# Patient Record
Sex: MALE | Race: ASIAN | Employment: FULL TIME | URBAN - METROPOLITAN AREA
[De-identification: names, ages, dates, MRNs, and addresses within clinical notes are randomized per-mention and may not be internally consistent; named-entity substitution may affect disease eponyms.]

---

## 2017-06-09 ENCOUNTER — HOSPITAL ENCOUNTER (OUTPATIENT)
Age: 32
Discharge: HOME | End: 2017-06-09
Attending: FAMILY MEDICINE
Payer: MEDICAID

## 2017-06-09 VITALS
HEART RATE: 75 BPM | TEMPERATURE: 98 F | OXYGEN SATURATION: 97 % | DIASTOLIC BLOOD PRESSURE: 79 MMHG | RESPIRATION RATE: 18 BRPM | SYSTOLIC BLOOD PRESSURE: 110 MMHG

## 2017-06-09 DIAGNOSIS — J06.9 VIRAL UPPER RESPIRATORY TRACT INFECTION: Primary | ICD-10-CM

## 2017-06-09 DIAGNOSIS — S03.00XA TMJ (DISLOCATION OF TEMPOROMANDIBULAR JOINT), INITIAL ENCOUNTER: ICD-10-CM

## 2017-06-09 PROCEDURE — 99213 OFFICE O/P EST LOW 20 MIN: CPT

## 2017-06-09 PROCEDURE — 99204 OFFICE O/P NEW MOD 45 MIN: CPT

## 2017-06-09 RX ORDER — NAPROXEN 500 MG/1
500 TABLET ORAL 2 TIMES DAILY PRN
Qty: 20 TABLET | Refills: 0 | Status: SHIPPED | OUTPATIENT
Start: 2017-06-09 | End: 2017-06-16

## 2017-06-09 NOTE — ED PROVIDER NOTES
Patient Seen in: [facility] [place]    History   Patient presents with:  Jaw Pain    Stated Complaint: jaw pain    HPI    Pt is a 28 yo with a 5 day h/o nasal congestion and cough. There are no fevers.  He also complains of bilat Neck: Normal range of motion. Neck supple. Cardiovascular: Normal rate and regular rhythm.     Pulmonary/Chest: Effort normal and breath sounds normal.   Musculoskeletal:   Tenderness over the bilateral TMJ   Neurological: He is alert and oriented to pe

## 2017-07-27 ENCOUNTER — TELEPHONE (OUTPATIENT)
Dept: FAMILY MEDICINE CLINIC | Facility: CLINIC | Age: 32
End: 2017-07-27

## 2017-07-28 ENCOUNTER — HOSPITAL ENCOUNTER (OUTPATIENT)
Dept: GENERAL RADIOLOGY | Age: 32
Discharge: HOME | End: 2017-07-28
Attending: NURSE PRACTITIONER
Payer: COMMERCIAL

## 2017-07-28 ENCOUNTER — OFFICE VISIT (OUTPATIENT)
Dept: FAMILY MEDICINE CLINIC | Facility: CLINIC | Age: 32
End: 2017-07-28

## 2017-07-28 VITALS
BODY MASS INDEX: 35.55 KG/M2 | DIASTOLIC BLOOD PRESSURE: 85 MMHG | TEMPERATURE: 98 F | HEIGHT: 69 IN | WEIGHT: 240 LBS | SYSTOLIC BLOOD PRESSURE: 125 MMHG | HEART RATE: 61 BPM

## 2017-07-28 DIAGNOSIS — M79.644 FINGER PAIN, RIGHT: Primary | ICD-10-CM

## 2017-07-28 DIAGNOSIS — M79.644 FINGER PAIN, RIGHT: ICD-10-CM

## 2017-07-28 PROCEDURE — 99214 OFFICE O/P EST MOD 30 MIN: CPT | Performed by: NURSE PRACTITIONER

## 2017-07-28 PROCEDURE — 73140 X-RAY EXAM OF FINGER(S): CPT | Performed by: NURSE PRACTITIONER

## 2017-07-28 PROCEDURE — A4570 SPLINT: HCPCS | Performed by: NURSE PRACTITIONER

## 2017-07-28 NOTE — PROGRESS NOTES
Pt presents with right 5th finger pain-was playing volleyball 2 weeks ago and injured finger (it bent backwards) . Re injured when he caught his child from falling the other day. Finger is swollen, painful at times, tender to medial aspect of PIP joint. Concern    Caffeine Concern Yes    Comment: Soda, rarely     Social History Narrative   None on file         No current outpatient prescriptions on file.     Allergies:  No Known Allergies    Physical Exam   Constitutional: He is oriented to person, place,

## 2017-08-21 ENCOUNTER — OFFICE VISIT (OUTPATIENT)
Dept: FAMILY MEDICINE CLINIC | Facility: CLINIC | Age: 32
End: 2017-08-21

## 2017-08-21 VITALS
HEART RATE: 57 BPM | HEIGHT: 69 IN | BODY MASS INDEX: 35.1 KG/M2 | DIASTOLIC BLOOD PRESSURE: 84 MMHG | WEIGHT: 237 LBS | SYSTOLIC BLOOD PRESSURE: 121 MMHG

## 2017-08-21 DIAGNOSIS — M79.641 RIGHT HAND PAIN: Primary | ICD-10-CM

## 2017-08-21 DIAGNOSIS — Z71.84 TRAVEL ADVICE ENCOUNTER: ICD-10-CM

## 2017-08-21 PROCEDURE — 99214 OFFICE O/P EST MOD 30 MIN: CPT | Performed by: NURSE PRACTITIONER

## 2017-08-21 RX ORDER — MEFLOQUINE HYDROCHLORIDE 250 MG/1
250 TABLET ORAL
Qty: 10 TABLET | Refills: 0 | Status: SHIPPED | OUTPATIENT
Start: 2017-08-21 | End: 2021-04-06

## 2017-08-21 NOTE — PROGRESS NOTES
HPI    Pt here for f/u to right 5th finger injury a couple of weeks ago-still having some pain and swelling to right 5th PIP joint. Used brace for a couple of days. Has not used ice or ibuprofen for pain and swelling Is right handed.     Will be traveling Head: Normocephalic and atraumatic. Eyes: Conjunctivae are normal. Pupils are equal, round, and reactive to light. Right eye exhibits no discharge. Left eye exhibits no discharge. Neck: Normal range of motion. Neck supple.    Cardiovascular: Normal rate

## 2018-09-04 ENCOUNTER — TELEPHONE (OUTPATIENT)
Dept: FAMILY MEDICINE CLINIC | Facility: CLINIC | Age: 33
End: 2018-09-04

## 2018-09-04 NOTE — TELEPHONE ENCOUNTER
Pt is requesting immunization record. Per pt he needs it by tomorrow for an apt. pls advise.  Thank you

## 2020-10-15 ENCOUNTER — TELEPHONE (OUTPATIENT)
Dept: OTHER | Age: 35
End: 2020-10-15

## 2020-10-15 NOTE — TELEPHONE ENCOUNTER
Informed pt of Dr. Finney Fillers message below. Pt states he will call back to schedule appt when he has his insurance information. Pt again denied any symptoms at this time.

## 2020-10-15 NOTE — TELEPHONE ENCOUNTER
Agree with nurse triage recommendation, given symptoms would require a telephone/video visit for COVID testing.

## 2020-10-15 NOTE — TELEPHONE ENCOUNTER
Reason for Call/Symptoms: pt asking to schedule appt with Dr. Luli Betancourt for symptoms  Onset: symptoms started 10/14/2020  Courtesy Assessment: pt was feeling tired yesterday, also had body aches and pain in legs.  Pt had temp of 101.6 (temporal) this morning,

## 2020-10-16 ENCOUNTER — TELEMEDICINE (OUTPATIENT)
Dept: FAMILY MEDICINE CLINIC | Facility: CLINIC | Age: 35
End: 2020-10-16
Payer: MEDICAID

## 2020-10-16 VITALS — TEMPERATURE: 99 F

## 2020-10-16 DIAGNOSIS — Z20.822 SUSPECTED COVID-19 VIRUS INFECTION: Primary | ICD-10-CM

## 2020-10-16 PROCEDURE — 99213 OFFICE O/P EST LOW 20 MIN: CPT | Performed by: NURSE PRACTITIONER

## 2020-10-16 NOTE — TELEPHONE ENCOUNTER
Spoke with pt's wife,  verified. She is requesting for appt for pt. Virtual appt made.      FYI        Future Appointments   Date Time Provider Hemant Benton   10/16/2020 11:15 AM ROSHAN RomanO

## 2020-10-16 NOTE — PROGRESS NOTES
Telemedicine Visit for Respiratory Illness - Potential COVID-19 Infection    Virtual/Telephone Check-In    Raza Saab verbally consents to a Virtual/Telephone Check-In service on 10/16/20.  Patient understands and accepts financial responsibility f Medication Sig Dispense Refill   • Mefloquine HCl 250 MG Oral Tab Take 1 tablet (250 mg total) by mouth every 7 days. 10 tablet 0     No Known Allergies  Physical Exam   Nursing note reviewed. Constitutional: He is oriented to person, place, and time. radiology tests and decision making.   Appropriate medical decision-making and tests are ordered as detailed in the plan of care above.”  5;45 min

## 2020-10-16 NOTE — ASSESSMENT & PLAN NOTE
Due to exposure last weekend-pt advised to follow rec guidelines of the cdc of self quarantine for 14 days.    covid 19 testing ordered  Supportive care discussed to help alleviate symptoms

## 2020-10-17 ENCOUNTER — APPOINTMENT (OUTPATIENT)
Dept: LAB | Age: 35
End: 2020-10-17
Attending: NURSE PRACTITIONER
Payer: MEDICAID

## 2020-10-17 DIAGNOSIS — Z20.822 SUSPECTED COVID-19 VIRUS INFECTION: ICD-10-CM

## 2020-10-22 ENCOUNTER — PATIENT MESSAGE (OUTPATIENT)
Dept: FAMILY MEDICINE CLINIC | Facility: CLINIC | Age: 35
End: 2020-10-22

## 2020-10-22 ENCOUNTER — TELEMEDICINE (OUTPATIENT)
Dept: FAMILY MEDICINE CLINIC | Facility: CLINIC | Age: 35
End: 2020-10-22
Payer: MEDICAID

## 2020-10-22 DIAGNOSIS — U07.1 COVID-19 VIRUS INFECTION: Primary | ICD-10-CM

## 2020-10-22 PROCEDURE — 99213 OFFICE O/P EST LOW 20 MIN: CPT | Performed by: STUDENT IN AN ORGANIZED HEALTH CARE EDUCATION/TRAINING PROGRAM

## 2020-10-22 RX ORDER — DEXTROMETHORPHAN HYDROBROMIDE AND PROMETHAZINE HYDROCHLORIDE 15; 6.25 MG/5ML; MG/5ML
5 SYRUP ORAL 4 TIMES DAILY PRN
Qty: 180 ML | Refills: 0 | Status: SHIPPED | OUTPATIENT
Start: 2020-10-22 | End: 2021-04-06

## 2020-10-22 RX ORDER — LOPERAMIDE HYDROCHLORIDE 2 MG/1
2 CAPSULE ORAL 3 TIMES DAILY PRN
Qty: 30 CAPSULE | Refills: 1 | Status: SHIPPED | OUTPATIENT
Start: 2020-10-22 | End: 2021-04-06

## 2020-10-22 NOTE — PROGRESS NOTES
Virtual Telephone Check-In    Joaquin Kaur verbally consents to a Virtual/Telephone Check-In visit on 10/22/20. Patient has been referred to the [facility] website at [email or web address] to review the yearly Consent to Treat document.     Patient und tests are ordered as detailed in the plan of care above. Coding/billing information is submitted for this visit based on complexity of care and/or time spent for the visit. HPI:    Patient ID: Kirkwood Goldmann is a 38year old male.     HPI  Pt prese continue quarantine precautions as discussed  - continue rest and hydration as tolerated  - will start Imodium for diarrhea symptoms  - will start Promethazine for persistent cough  - provided with work note  - discussed red flags for urgent reevaluation

## 2020-10-23 NOTE — TELEPHONE ENCOUNTER
From: Joaquin Kaur  To: Kalen Light MD  Sent: 10/22/2020 7:32 PM CDT  Subject: Prescription Question    Hi Dr.     I just called to the [facility] to check my prescription.    they do not have my caugh and diarrhea prescriptio

## 2020-10-26 ENCOUNTER — TELEMEDICINE (OUTPATIENT)
Dept: FAMILY MEDICINE CLINIC | Facility: CLINIC | Age: 35
End: 2020-10-26
Payer: MEDICAID

## 2020-10-26 DIAGNOSIS — U07.1 COVID-19 VIRUS INFECTION: Primary | ICD-10-CM

## 2020-10-26 PROCEDURE — 99213 OFFICE O/P EST LOW 20 MIN: CPT | Performed by: STUDENT IN AN ORGANIZED HEALTH CARE EDUCATION/TRAINING PROGRAM

## 2020-10-26 NOTE — PROGRESS NOTES
Virtual Telephone Check-In    Mindy Gallegos verbally consents to a Virtual/Telephone Check-In visit on 10/26/20. Patient has been referred to the [facility] website at [email or web address] to review the yearly Consent to Treat document.     Patient und ordered as detailed in the plan of care above. Coding/billing information is submitted for this visit based on complexity of care and/or time spent for the visit. HPI:    Patient ID: Chanelle Allred is a 38year old male.     HPI  Pt presenting vi fever-reducing medications  - increased hydration and rest as tolerated  - encouraged to continue safety measures including hand hygiene and social distancing  - to call with any questions or concerns    RTC as needed.  Pt verbalized understanding and agree

## 2021-04-06 ENCOUNTER — OFFICE VISIT (OUTPATIENT)
Dept: INTERNAL MEDICINE CLINIC | Facility: CLINIC | Age: 36
End: 2021-04-06
Payer: MEDICAID

## 2021-04-06 ENCOUNTER — NURSE TRIAGE (OUTPATIENT)
Dept: FAMILY MEDICINE CLINIC | Facility: CLINIC | Age: 36
End: 2021-04-06

## 2021-04-06 VITALS
SYSTOLIC BLOOD PRESSURE: 128 MMHG | DIASTOLIC BLOOD PRESSURE: 89 MMHG | RESPIRATION RATE: 16 BRPM | HEART RATE: 78 BPM | WEIGHT: 256 LBS | HEIGHT: 70 IN | BODY MASS INDEX: 36.65 KG/M2

## 2021-04-06 DIAGNOSIS — J01.00 ACUTE NON-RECURRENT MAXILLARY SINUSITIS: ICD-10-CM

## 2021-04-06 DIAGNOSIS — K12.0 APHTHOUS ULCER: Primary | ICD-10-CM

## 2021-04-06 PROBLEM — Z20.822 SUSPECTED COVID-19 VIRUS INFECTION: Status: RESOLVED | Noted: 2020-10-16 | Resolved: 2021-04-06

## 2021-04-06 PROCEDURE — 3074F SYST BP LT 130 MM HG: CPT | Performed by: INTERNAL MEDICINE

## 2021-04-06 PROCEDURE — 3008F BODY MASS INDEX DOCD: CPT | Performed by: INTERNAL MEDICINE

## 2021-04-06 PROCEDURE — 3079F DIAST BP 80-89 MM HG: CPT | Performed by: INTERNAL MEDICINE

## 2021-04-06 PROCEDURE — 99203 OFFICE O/P NEW LOW 30 MIN: CPT | Performed by: INTERNAL MEDICINE

## 2021-04-06 RX ORDER — FLUTICASONE PROPIONATE 50 MCG
2 SPRAY, SUSPENSION (ML) NASAL DAILY
Qty: 1 BOTTLE | Refills: 3 | Status: SHIPPED | OUTPATIENT
Start: 2021-04-06 | End: 2021-07-08

## 2021-04-06 RX ORDER — LIDOCAINE HYDROCHLORIDE 20 MG/ML
SOLUTION OROPHARYNGEAL
Qty: 100 ML | Refills: 1 | Status: SHIPPED | OUTPATIENT
Start: 2021-04-06 | End: 2021-07-08

## 2021-04-06 NOTE — PROGRESS NOTES
Atiya Kaur is a 43year old male.   Patient presents with:  Sore Throat      HPI:   Urgent visit   C/c sore throat   C/o left side jaw pain x 2 days -thinks wisdom tooth is coming out - thinks he has an ulcer too   He does not grind his teeth or cl tenderness  NECK: supple,no adenopathy, nontender  LUNGS: clear to auscultation, no wheeze  CARDIO: RRR without murmur      ASSESSMENT AND PLAN:   Diagnoses and all orders for this visit:    Aphthous ulcer  -     Lidocaine Viscous HCl 2 % Mouth/Throat Solu

## 2021-04-06 NOTE — TELEPHONE ENCOUNTER
Action Requested: Summary for Provider     []  Critical Lab, Recommendations Needed  [] Need Additional Advice  [x]   FYI    []   Need Orders  [] Need Medications Sent to Pharmacy  []  Other     SUMMARY: For the last 2 days patient has been having painful

## 2021-07-08 ENCOUNTER — OFFICE VISIT (OUTPATIENT)
Dept: FAMILY MEDICINE CLINIC | Facility: CLINIC | Age: 36
End: 2021-07-08
Payer: MEDICAID

## 2021-07-08 VITALS
WEIGHT: 254.81 LBS | HEART RATE: 72 BPM | SYSTOLIC BLOOD PRESSURE: 120 MMHG | HEIGHT: 70 IN | BODY MASS INDEX: 36.48 KG/M2 | DIASTOLIC BLOOD PRESSURE: 83 MMHG

## 2021-07-08 DIAGNOSIS — Z00.00 PHYSICAL EXAM: Primary | ICD-10-CM

## 2021-07-08 DIAGNOSIS — J01.00 ACUTE NON-RECURRENT MAXILLARY SINUSITIS: ICD-10-CM

## 2021-07-08 DIAGNOSIS — E66.09 CLASS 2 OBESITY DUE TO EXCESS CALORIES WITHOUT SERIOUS COMORBIDITY WITH BODY MASS INDEX (BMI) OF 36.0 TO 36.9 IN ADULT: ICD-10-CM

## 2021-07-08 PROCEDURE — 99395 PREV VISIT EST AGE 18-39: CPT | Performed by: FAMILY MEDICINE

## 2021-07-08 PROCEDURE — 3074F SYST BP LT 130 MM HG: CPT | Performed by: FAMILY MEDICINE

## 2021-07-08 PROCEDURE — 3079F DIAST BP 80-89 MM HG: CPT | Performed by: FAMILY MEDICINE

## 2021-07-08 PROCEDURE — 3008F BODY MASS INDEX DOCD: CPT | Performed by: FAMILY MEDICINE

## 2021-07-08 RX ORDER — FLUTICASONE PROPIONATE 50 MCG
2 SPRAY, SUSPENSION (ML) NASAL DAILY
Qty: 1 EACH | Refills: 2 | Status: SHIPPED | OUTPATIENT
Start: 2021-07-08 | End: 2022-01-04

## 2021-07-08 NOTE — PROGRESS NOTES
7/8/2021  1:59 PM    Raza Nixon is a 43year old male. Chief complaint(s): Patient presents with:  Physical    HPI:     Jenny De La Cruz primary complaint is regarding CPE.      Jenny De La Cruz is a 43year old male is here for routine pe for appetite change, fatigue and fever. HENT: Positive for rhinorrhea. Negative for hearing loss and nosebleeds. Eyes: Negative for pain and visual disturbance. Respiratory: Negative for apnea and shortness of breath.     Cardiovascular: Negative for Abdomen is soft. There is no hepatomegaly, splenomegaly or mass. Tenderness: There is no abdominal tenderness. Hernia: No hernia is present. Musculoskeletal:      Cervical back: Neck supple. Comments: Spinal exam without scoliosis.       Tu Escobedo physical activity; exercise daily or at least 3 times a week for 30-60 minutes doing cardiovascular exercise. Encourage to maintain the best physical and dental hygiene possible.   Consider a  if overweight and/or having difficult in staying

## 2021-07-09 ENCOUNTER — LAB ENCOUNTER (OUTPATIENT)
Dept: LAB | Age: 36
End: 2021-07-09
Attending: FAMILY MEDICINE
Payer: MEDICAID

## 2021-07-09 DIAGNOSIS — Z00.00 PHYSICAL EXAM: ICD-10-CM

## 2021-07-09 LAB
ALBUMIN SERPL-MCNC: 3.5 G/DL (ref 3.4–5)
ALBUMIN/GLOB SERPL: 0.9 {RATIO} (ref 1–2)
ALP LIVER SERPL-CCNC: 63 U/L
ALT SERPL-CCNC: 33 U/L
ANION GAP SERPL CALC-SCNC: 5 MMOL/L (ref 0–18)
AST SERPL-CCNC: 19 U/L (ref 15–37)
BASOPHILS # BLD AUTO: 0.03 X10(3) UL (ref 0–0.2)
BASOPHILS NFR BLD AUTO: 0.4 %
BILIRUB SERPL-MCNC: 0.5 MG/DL (ref 0.1–2)
BILIRUB UR QL: NEGATIVE
BUN BLD-MCNC: 11 MG/DL (ref 7–18)
BUN/CREAT SERPL: 12.2 (ref 10–20)
CALCIUM BLD-MCNC: 8.8 MG/DL (ref 8.5–10.1)
CHLORIDE SERPL-SCNC: 104 MMOL/L (ref 98–112)
CHOLEST SMN-MCNC: 177 MG/DL (ref ?–200)
CLARITY UR: CLEAR
CO2 SERPL-SCNC: 30 MMOL/L (ref 21–32)
COLOR UR: YELLOW
CREAT BLD-MCNC: 0.9 MG/DL
DEPRECATED RDW RBC AUTO: 39.4 FL (ref 35.1–46.3)
EOSINOPHIL # BLD AUTO: 0.2 X10(3) UL (ref 0–0.7)
EOSINOPHIL NFR BLD AUTO: 2.6 %
ERYTHROCYTE [DISTWIDTH] IN BLOOD BY AUTOMATED COUNT: 12.6 % (ref 11–15)
EST. AVERAGE GLUCOSE BLD GHB EST-MCNC: 131 MG/DL (ref 68–126)
GLOBULIN PLAS-MCNC: 3.7 G/DL (ref 2.8–4.4)
GLUCOSE BLD-MCNC: 116 MG/DL (ref 70–99)
GLUCOSE UR-MCNC: NEGATIVE MG/DL
HBA1C MFR BLD HPLC: 6.2 % (ref ?–5.7)
HCT VFR BLD AUTO: 45 %
HDLC SERPL-MCNC: 45 MG/DL (ref 40–59)
HGB BLD-MCNC: 14.3 G/DL
HGB UR QL STRIP.AUTO: NEGATIVE
IMM GRANULOCYTES # BLD AUTO: 0.03 X10(3) UL (ref 0–1)
IMM GRANULOCYTES NFR BLD: 0.4 %
KETONES UR-MCNC: NEGATIVE MG/DL
LDLC SERPL CALC-MCNC: 111 MG/DL (ref ?–100)
LEUKOCYTE ESTERASE UR QL STRIP.AUTO: NEGATIVE
LYMPHOCYTES # BLD AUTO: 2.25 X10(3) UL (ref 1–4)
LYMPHOCYTES NFR BLD AUTO: 29.6 %
M PROTEIN MFR SERPL ELPH: 7.2 G/DL (ref 6.4–8.2)
MCH RBC QN AUTO: 27.1 PG (ref 26–34)
MCHC RBC AUTO-ENTMCNC: 31.8 G/DL (ref 31–37)
MCV RBC AUTO: 85.2 FL
MONOCYTES # BLD AUTO: 0.5 X10(3) UL (ref 0.1–1)
MONOCYTES NFR BLD AUTO: 6.6 %
NEUTROPHILS # BLD AUTO: 4.6 X10 (3) UL (ref 1.5–7.7)
NEUTROPHILS # BLD AUTO: 4.6 X10(3) UL (ref 1.5–7.7)
NEUTROPHILS NFR BLD AUTO: 60.4 %
NITRITE UR QL STRIP.AUTO: NEGATIVE
NONHDLC SERPL-MCNC: 132 MG/DL (ref ?–130)
OSMOLALITY SERPL CALC.SUM OF ELEC: 288 MOSM/KG (ref 275–295)
PATIENT FASTING Y/N/NP: YES
PATIENT FASTING Y/N/NP: YES
PH UR: 7 [PH] (ref 5–8)
PLATELET # BLD AUTO: 129 10(3)UL (ref 150–450)
POTASSIUM SERPL-SCNC: 4.2 MMOL/L (ref 3.5–5.1)
PROT UR-MCNC: NEGATIVE MG/DL
RBC # BLD AUTO: 5.28 X10(6)UL
SODIUM SERPL-SCNC: 139 MMOL/L (ref 136–145)
SP GR UR STRIP: 1.02 (ref 1–1.03)
TRIGL SERPL-MCNC: 117 MG/DL (ref 30–149)
TSI SER-ACNC: 1.51 MIU/ML (ref 0.36–3.74)
UROBILINOGEN UR STRIP-ACNC: <2
VLDLC SERPL CALC-MCNC: 20 MG/DL (ref 0–30)
WBC # BLD AUTO: 7.6 X10(3) UL (ref 4–11)

## 2021-07-09 PROCEDURE — 93005 ELECTROCARDIOGRAM TRACING: CPT

## 2021-07-09 PROCEDURE — 93010 ELECTROCARDIOGRAM REPORT: CPT | Performed by: FAMILY MEDICINE

## 2021-07-09 PROCEDURE — 84443 ASSAY THYROID STIM HORMONE: CPT

## 2021-07-09 PROCEDURE — 80053 COMPREHEN METABOLIC PANEL: CPT

## 2021-07-09 PROCEDURE — 82306 VITAMIN D 25 HYDROXY: CPT | Performed by: FAMILY MEDICINE

## 2021-07-09 PROCEDURE — 81015 MICROSCOPIC EXAM OF URINE: CPT | Performed by: FAMILY MEDICINE

## 2021-07-09 PROCEDURE — 36415 COLL VENOUS BLD VENIPUNCTURE: CPT

## 2021-07-09 PROCEDURE — 83036 HEMOGLOBIN GLYCOSYLATED A1C: CPT

## 2021-07-09 PROCEDURE — 80061 LIPID PANEL: CPT

## 2021-07-09 PROCEDURE — 81001 URINALYSIS AUTO W/SCOPE: CPT | Performed by: FAMILY MEDICINE

## 2021-07-09 PROCEDURE — 85025 COMPLETE CBC W/AUTO DIFF WBC: CPT

## 2021-07-12 LAB — 25(OH)D3 SERPL-MCNC: 21.1 NG/ML (ref 30–100)

## 2021-07-12 RX ORDER — ERGOCALCIFEROL 1.25 MG/1
50000 CAPSULE ORAL WEEKLY
Qty: 12 CAPSULE | Refills: 4 | Status: SHIPPED | OUTPATIENT
Start: 2021-07-12 | End: 2021-08-11

## 2021-09-28 ENCOUNTER — NURSE TRIAGE (OUTPATIENT)
Dept: FAMILY MEDICINE CLINIC | Facility: CLINIC | Age: 36
End: 2021-09-28

## 2021-09-28 ENCOUNTER — APPOINTMENT (OUTPATIENT)
Dept: ULTRASOUND IMAGING | Facility: HOSPITAL | Age: 36
End: 2021-09-28
Attending: EMERGENCY MEDICINE
Payer: MEDICAID

## 2021-09-28 ENCOUNTER — HOSPITAL ENCOUNTER (EMERGENCY)
Facility: HOSPITAL | Age: 36
Discharge: HOME | End: 2021-09-28
Attending: EMERGENCY MEDICINE
Payer: MEDICAID

## 2021-09-28 VITALS
DIASTOLIC BLOOD PRESSURE: 74 MMHG | HEIGHT: 69 IN | WEIGHT: 242 LBS | OXYGEN SATURATION: 98 % | RESPIRATION RATE: 18 BRPM | TEMPERATURE: 98 F | SYSTOLIC BLOOD PRESSURE: 128 MMHG | BODY MASS INDEX: 35.84 KG/M2 | HEART RATE: 71 BPM

## 2021-09-28 DIAGNOSIS — T14.8XXA MUSCLE STRAIN: Primary | ICD-10-CM

## 2021-09-28 PROCEDURE — 99284 EMERGENCY DEPT VISIT MOD MDM: CPT

## 2021-09-28 PROCEDURE — 93971 EXTREMITY STUDY: CPT | Performed by: EMERGENCY MEDICINE

## 2021-09-28 RX ORDER — IBUPROFEN 600 MG/1
600 TABLET ORAL EVERY 8 HOURS PRN
Qty: 30 TABLET | Refills: 0 | Status: SHIPPED | OUTPATIENT
Start: 2021-09-28 | End: 2021-10-05

## 2021-09-28 RX ORDER — CYCLOBENZAPRINE HCL 10 MG
10 TABLET ORAL 3 TIMES DAILY PRN
Qty: 20 TABLET | Refills: 0 | Status: SHIPPED | OUTPATIENT
Start: 2021-09-28 | End: 2021-10-05

## 2021-09-28 RX ORDER — IBUPROFEN 600 MG/1
600 TABLET ORAL ONCE
Status: COMPLETED | OUTPATIENT
Start: 2021-09-28 | End: 2021-09-28

## 2021-09-28 RX ORDER — CYCLOBENZAPRINE HCL 10 MG
10 TABLET ORAL ONCE
Status: COMPLETED | OUTPATIENT
Start: 2021-09-28 | End: 2021-09-28

## 2021-09-28 NOTE — ED QUICK NOTES
Patient cleared for discharge by MD. Patient verbalizes understanding of discharge instructions and prescriptions. Patient given a walker to go home.

## 2021-09-28 NOTE — ED PROVIDER NOTES
Patient Seen in: [facility] Emergency Department      History   Patient presents with:  Leg or Foot Injury    Stated Complaint: sent by pcp to r/o dvt     Subjective:   HPI    25-year-old male with no significant past medical history here with com Wt 109.8 kg   SpO2 98%   BMI 35.74 kg/m²         Physical Exam  Vitals and nursing note reviewed. HENT:      Head: Normocephalic. Mouth/Throat:      Mouth: Mucous membranes are moist.   Eyes:      Extraocular Movements: Extraocular movements intact. procedures, and reviewed those reports. Complicating Factors: The patient already has does not have any pertinent problems on file. to contribute to the complexity of his ED evaluation.     - pt  comfortable with d/c at this time, will d/c pt home now wi

## 2021-09-28 NOTE — TELEPHONE ENCOUNTER
Action Requested: Summary for Provider     []  Critical Lab, Recommendations Needed  [] Need Additional Advice  []   FYI    []   Need Orders  [] Need Medications Sent to Pharmacy  []  Other     SUMMARY: Patient sent to ER now.     Reason for call: Leg Pain

## 2021-09-28 NOTE — ED INITIAL ASSESSMENT (HPI)
Patient with \"moy horse\" while playing cricket multiple days ago. Tried Tylenol, heat, topical rub for muscle pain without relief. Sent for r/o DVT. Unable to ambulate without pain.

## 2022-01-03 ENCOUNTER — TELEPHONE (OUTPATIENT)
Dept: FAMILY MEDICINE CLINIC | Facility: CLINIC | Age: 37
End: 2022-01-03

## 2022-01-03 DIAGNOSIS — Z11.52 ENCOUNTER FOR SCREENING FOR COVID-19: Primary | ICD-10-CM

## 2022-01-03 NOTE — TELEPHONE ENCOUNTER
Pt would like to know if the doctor can give him an order to get a Covid test for traveling. Pt states that he is traveling on 1-9-21. Please, call pt with any questions and when the order is placed.

## 2022-01-04 DIAGNOSIS — J01.00 ACUTE NON-RECURRENT MAXILLARY SINUSITIS: ICD-10-CM

## 2022-01-05 RX ORDER — FLUTICASONE PROPIONATE 50 MCG
2 SPRAY, SUSPENSION (ML) NASAL DAILY
Qty: 1 EACH | Refills: 2 | Status: SHIPPED | OUTPATIENT
Start: 2022-01-05 | End: 2022-12-31

## 2022-01-07 ENCOUNTER — LAB ENCOUNTER (OUTPATIENT)
Dept: LAB | Facility: HOSPITAL | Age: 37
End: 2022-01-07
Attending: FAMILY MEDICINE
Payer: MEDICAID

## 2022-01-07 DIAGNOSIS — Z11.52 ENCOUNTER FOR SCREENING FOR COVID-19: ICD-10-CM

## 2022-01-08 LAB — SARS-COV-2 RNA RESP QL NAA+PROBE: NOT DETECTED

## 2022-01-27 ENCOUNTER — OFFICE VISIT (OUTPATIENT)
Dept: SURGERY | Facility: CLINIC | Age: 37
End: 2022-01-27
Payer: MEDICAID

## 2022-01-27 ENCOUNTER — APPOINTMENT (OUTPATIENT)
Dept: GENERAL RADIOLOGY | Age: 37
End: 2022-01-27
Attending: PHYSICIAN ASSISTANT
Payer: MEDICAID

## 2022-01-27 ENCOUNTER — HOSPITAL ENCOUNTER (OUTPATIENT)
Age: 37
Discharge: HOME | End: 2022-01-27
Payer: MEDICAID

## 2022-01-27 VITALS
WEIGHT: 248 LBS | SYSTOLIC BLOOD PRESSURE: 139 MMHG | HEART RATE: 78 BPM | OXYGEN SATURATION: 98 % | DIASTOLIC BLOOD PRESSURE: 82 MMHG | TEMPERATURE: 98 F | HEIGHT: 70 IN | RESPIRATION RATE: 18 BRPM | BODY MASS INDEX: 35.5 KG/M2

## 2022-01-27 DIAGNOSIS — S62.623A CLOSED DISPLACED FRACTURE OF MIDDLE PHALANX OF LEFT MIDDLE FINGER, INITIAL ENCOUNTER: Primary | ICD-10-CM

## 2022-01-27 DIAGNOSIS — M79.645 FINGER PAIN, LEFT: ICD-10-CM

## 2022-01-27 DIAGNOSIS — S62.653A CLOSED NONDISPLACED FRACTURE OF MIDDLE PHALANX OF LEFT MIDDLE FINGER, INITIAL ENCOUNTER: Primary | ICD-10-CM

## 2022-01-27 PROCEDURE — 99204 OFFICE O/P NEW MOD 45 MIN: CPT | Performed by: PLASTIC SURGERY

## 2022-01-27 PROCEDURE — 73140 X-RAY EXAM OF FINGER(S): CPT | Performed by: PHYSICIAN ASSISTANT

## 2022-01-27 PROCEDURE — A4570 SPLINT: HCPCS | Performed by: PHYSICIAN ASSISTANT

## 2022-01-27 PROCEDURE — 99213 OFFICE O/P EST LOW 20 MIN: CPT | Performed by: PHYSICIAN ASSISTANT

## 2022-01-27 RX ORDER — IBUPROFEN 800 MG/1
800 TABLET ORAL EVERY 8 HOURS PRN
Qty: 20 TABLET | Refills: 0 | Status: SHIPPED | OUTPATIENT
Start: 2022-01-27

## 2022-01-27 NOTE — ED INITIAL ASSESSMENT (HPI)
Pt states he injured his L middle finger when playing cricket in [facility] last week. He received xrays and was told there was no noted fracture to the hand/finger.  He states pain/swelling has since gotten better however he still has movement limitations and i

## 2022-01-27 NOTE — H&P
Yulisa Moore is a 43year old male that presents with Patient presents with:   Injury: LMF PIP      REFERRED BY:  Skip Moulton    Pacemaker: No  Latex Allergy: no  Coumadin: No  Plavix: No  Other anticoagulants: No  Cardiac stents: No    HAND No    Drug use: No       FAMILY HISTORY  Family History   Problem Relation Age of Onset   • Other (Other) Father         PSORIASIS   • Diabetes Mother    • Hypertension Mother    • Hypertension Other         Paternal Family h/o Hypertension   • Diabetes Br healing, the hand/digit may not regain normal ROM or normal function. He has good passive motion, so after reduction of review days, we will start early protected motion.     Finger splint  Do not remove    4 days, start OT    1/27/2022  Gabrielle Ortiz

## 2022-01-27 NOTE — ED PROVIDER NOTES
Patient Seen in: [facility] [place]      History   Patient presents with:  Arm or Hand Injury    Stated Complaint: inj finger    Subjective:   HPI    Is a 41-year-old male who presents the [facility] with complaints of left middle finger pain status post in refill < 2 seconds.         ED Course     PROCEDURE: XR FINGER(S) (MIN 2 VIEWS), LEFT 3RD (CPT=73140)       COMPARISON: [facility], XR FINGER (MIN 2 VIEW), RIGHT (CPT=73140), 7/28/2017, 9:26 AM.       INDICATIONS: Left distal 3rd digit pain a

## 2022-01-27 NOTE — PROGRESS NOTES
Per Dr Ivana Davis resplinted LMF with chace,frog splint and coban. Pt instructed no dressing changes and to keep it clean and dry  Verbalized understanding.

## 2022-01-31 ENCOUNTER — OFFICE VISIT (OUTPATIENT)
Dept: SURGERY | Facility: CLINIC | Age: 37
End: 2022-01-31
Payer: MEDICAID

## 2022-01-31 DIAGNOSIS — M25.642 STIFFNESS OF JOINT, HAND, LEFT: Primary | ICD-10-CM

## 2022-01-31 DIAGNOSIS — S62.653A CLOSED NONDISPLACED FRACTURE OF MIDDLE PHALANX OF LEFT MIDDLE FINGER, INITIAL ENCOUNTER: Primary | ICD-10-CM

## 2022-01-31 DIAGNOSIS — M62.81 DISTAL MUSCLE WEAKNESS: ICD-10-CM

## 2022-01-31 PROCEDURE — 99213 OFFICE O/P EST LOW 20 MIN: CPT | Performed by: PLASTIC SURGERY

## 2022-01-31 PROCEDURE — 29130 APPL FINGER SPLINT STATIC: CPT | Performed by: OCCUPATIONAL THERAPIST

## 2022-01-31 RX ORDER — MULTIVIT-MIN/IRON/FOLIC ACID/K 18-600-40
2000 CAPSULE ORAL
COMMUNITY

## 2022-01-31 NOTE — PROGRESS NOTES
Injury 1: LMF PIP volar lip, non-displaced  - Date: 01/23/22  - Days Since: 8  Intermittent sharp pain to touch. Rates pain 5/10. Taking Ibuprofen prn,helpful. Denies numbness and tingling. Arrived with coban and splint CDI,removed.   No dressing change

## 2022-01-31 NOTE — PROGRESS NOTES
Subjective: I hurt my hand play [place] in [facility].       Objective:     Current level of performance:  ADL: Independent  Work: On leave  Leisure: [place]    Measurements/Tests:  ROM:         N/A         Treatment Provided this day: Fabricated a LMF extension

## 2022-02-03 ENCOUNTER — OFFICE VISIT (OUTPATIENT)
Dept: SURGERY | Facility: CLINIC | Age: 37
End: 2022-02-03
Payer: MEDICAID

## 2022-02-03 DIAGNOSIS — M62.81 DISTAL MUSCLE WEAKNESS: ICD-10-CM

## 2022-02-03 DIAGNOSIS — M25.642 STIFFNESS OF JOINT, HAND, LEFT: Primary | ICD-10-CM

## 2022-02-03 PROCEDURE — 97166 OT EVAL MOD COMPLEX 45 MIN: CPT | Performed by: OCCUPATIONAL THERAPIST

## 2022-02-03 PROCEDURE — 97110 THERAPEUTIC EXERCISES: CPT | Performed by: OCCUPATIONAL THERAPIST

## 2022-02-03 NOTE — PROGRESS NOTES
OCCUPATIONAL THERAPY EVALUATION:   Melida Wylie   [id number]       SUBJECTIVE:    HX of Injury: LMF PIP volar lip non-displaced fracture. Chief Complaint:   LMF PIP discomfort with passive motion. .    Precautions: 2 # lifting restriction with the left hand. Premorbid Functional Status: Independent w/ Occ. duties, Independent w/ driving / sitting, Independent w/ ADL's  Current Level of Function: Independent with self care task needs. Employment: Temporary leave  Hand Dominance: right  Living Situation: Family  Barriers to Learning: None  Patient Goals: To return to work. Imaging/Tests: X-ray        OBJECTIVE DATA:   PAIN:   Rating (1/10): 1/10 at rest, 2/10 with activity  Location:     OBSERVATION:   Guarding movements    ORTHOTICS:  LMF extension splint. SCAR/INCISION: Not applicable. SENSORY:  Intact      AROM/PROM:  (Degrees)  LEFT HAND:    Thumb IF MF RF SF   MP   0/70     PIP   0/40     DIP   0/30     HORNER   140 degrees of HORNER.               STRENGTH: (lbs) Right Average Left Average   : NT NT   2 pt Pinch:     3 pt Pinch:     Lateral Pinch:         ASSESSMENT & PLAN OF CARE:    Treatment Provided: Patient was seen for an initial evaluation, hand washing :  HEP:  AROM, Tendon glides, x 20 reps per set, x 5 sets daily. Reviewed hand elevation importance. Written handout was provided to reinforce today's treatment and educational session. Rehabilitation Potential: Good    CLINICAL ASSESSMENT:    Patient/Caregiver Education Provided: Yes    Treatment Plan:  Therapeutic Exercise  Modalities  Patient/Family Education  Splinting: LMF extension splint. GOALS:  Short term goals to be reached in x 2 weeks:    1) Independent with HEP. Darlene Porter 2) Increased MP AROM x 15 - 20 degrees . Increased PIP AROM  X 15 - 20 degrees. Increased DIP AROM  X 15 - 20 degrees.         Long term goals to be reached in x 1 month:    .1) Full functional use of the involved extremity for self-care, leisure and work related tasks:          Patient will be seen 2 x /week for 3 weeks or a total of 6 visits. Pt. was advised regarding the findings of this evaluation and agrees to the plan of care. Vika Solis I have reviewed the treatment plan and concur.    Lizeth Altamirano MD

## 2022-02-09 ENCOUNTER — TELEPHONE (OUTPATIENT)
Dept: SURGERY | Facility: CLINIC | Age: 37
End: 2022-02-09

## 2022-02-09 ENCOUNTER — OFFICE VISIT (OUTPATIENT)
Dept: SURGERY | Facility: CLINIC | Age: 37
End: 2022-02-09
Payer: MEDICAID

## 2022-02-09 DIAGNOSIS — M62.81 DISTAL MUSCLE WEAKNESS: ICD-10-CM

## 2022-02-09 DIAGNOSIS — M25.642 STIFFNESS OF JOINT, HAND, LEFT: Primary | ICD-10-CM

## 2022-02-09 PROCEDURE — 97110 THERAPEUTIC EXERCISES: CPT | Performed by: OCCUPATIONAL THERAPIST

## 2022-02-09 PROCEDURE — 97022 WHIRLPOOL THERAPY: CPT | Performed by: OCCUPATIONAL THERAPIST

## 2022-02-09 NOTE — TELEPHONE ENCOUNTER
Pt here for OT appointment asking if ibuprofen can be refilled for pain to LMF. Pt continues OT exercise plan. Pt denies pain at this time because he is taking ibuprofen 800mg. Instructed pt to purchase over the counter ibuprofen 200 mg tablets and take as directed per medication label. Pt verbalized understanding. Dr. Saida Muir made aware.

## 2022-02-09 NOTE — PROGRESS NOTES
Subjective: I am almost out of pain medication. Objective:     Current level of performance:  ADL: Independent  Work: On leave  Leisure: [place]    Measurements/Tests:  ROM:         Not formally assessed this day. Treatment Provided this day: Fluidotherapy to the left wrist and hand: To increase active range of motion, reduce joint stiffness, as well as decrease scar sensitivity, for increased functional use of the involved extremity, during self care, work and or leisure time tasks. PROM of the LMF PIP joint, x 20 reps per set, x 5 sets daily; Reviewed HEP, as well as splint wear and care. Treatment Time: 30 minutes      Summary/Analysis of Treatment session: Patient continues to have complaints of LMF PIP joint discomfort at rest, as well as during therapeutic exercise regime. Plan: Full use of the left hand in x 4 - 5 weeks.       Follow up in:  02/15/2022          Umair Ambrosia  OTR/L

## 2022-02-16 ENCOUNTER — OFFICE VISIT (OUTPATIENT)
Dept: SURGERY | Facility: CLINIC | Age: 37
End: 2022-02-16
Payer: MEDICAID

## 2022-02-16 DIAGNOSIS — M62.81 DISTAL MUSCLE WEAKNESS: ICD-10-CM

## 2022-02-16 DIAGNOSIS — M25.642 STIFFNESS OF JOINT, HAND, LEFT: Primary | ICD-10-CM

## 2022-02-16 PROCEDURE — 97022 WHIRLPOOL THERAPY: CPT | Performed by: OCCUPATIONAL THERAPIST

## 2022-02-16 PROCEDURE — 97110 THERAPEUTIC EXERCISES: CPT | Performed by: OCCUPATIONAL THERAPIST

## 2022-02-16 NOTE — PROGRESS NOTES
Subjective: Will the doctor re-xray my finger ? 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 Leisure: Family    Measurements/Tests:  ROM:  Testing By: kiko        PIP Middle Left: 0/45 degrees passively. Treatment Provided this day: Fluidotherapy to the left wrist and hand: To increase active range of motion, reduce joint stiffness, as well as decrease scar sensitivity, for increased functional use of the involved extremity, during self care, work and or leisure time tasks. PROM of the LMF PIP: x 20 reps per set, x 5 sets daily. Reviewed HEP. Treatment Time: 30 minutes      Summary/Analysis of Treatment session: Patient has been compliant with HEP, as prescribed. Plan: Full use of the left hand in x 4 - 6 weeks.       Follow up in:  [address]  OTR/L

## 2022-03-03 ENCOUNTER — OFFICE VISIT (OUTPATIENT)
Dept: SURGERY | Facility: CLINIC | Age: 37
End: 2022-03-03
Payer: MEDICAID

## 2022-03-03 DIAGNOSIS — S62.653A CLOSED NONDISPLACED FRACTURE OF MIDDLE PHALANX OF LEFT MIDDLE FINGER, INITIAL ENCOUNTER: Primary | ICD-10-CM

## 2022-03-03 DIAGNOSIS — M62.81 DISTAL MUSCLE WEAKNESS: ICD-10-CM

## 2022-03-03 DIAGNOSIS — M25.642 STIFFNESS OF JOINT, HAND, LEFT: Primary | ICD-10-CM

## 2022-03-03 PROCEDURE — 99213 OFFICE O/P EST LOW 20 MIN: CPT | Performed by: PLASTIC SURGERY

## 2022-03-03 PROCEDURE — 97110 THERAPEUTIC EXERCISES: CPT | Performed by: OCCUPATIONAL THERAPIST

## 2022-03-03 RX ORDER — ERGOCALCIFEROL 1.25 MG/1
CAPSULE ORAL
COMMUNITY
Start: 2022-03-01

## 2022-03-03 NOTE — PROGRESS NOTES
Subjective: I have tenderness in the LMF PIP joint. Objective:     Current level of performance:  ADL: Independent  Work: On leave:  Returning to full duty on 03/17/2022. Leisure: Family    Measurements/Tests:  ROM:         N/A         Treatment Provided this day: Initiated passive range of motion, blocking, AROM of the LMF: X 20 reps per set, x 5 sets daily. Treatment Time: 20 minutes      Summary/Analysis of Treatment session: Progressing well with OT goals and objectives. Plan: Full use of the left hand in x 1 month.       Follow up in:  03/08/2022          Colin Subramanian OTR/L

## 2022-03-10 ENCOUNTER — OFFICE VISIT (OUTPATIENT)
Dept: SURGERY | Facility: CLINIC | Age: 37
End: 2022-03-10
Payer: MEDICAID

## 2022-03-10 DIAGNOSIS — M62.81 DISTAL MUSCLE WEAKNESS: ICD-10-CM

## 2022-03-10 DIAGNOSIS — M25.642 STIFFNESS OF JOINT, HAND, LEFT: Primary | ICD-10-CM

## 2022-03-10 PROCEDURE — 97110 THERAPEUTIC EXERCISES: CPT | Performed by: OCCUPATIONAL THERAPIST

## 2022-03-10 PROCEDURE — 97022 WHIRLPOOL THERAPY: CPT | Performed by: OCCUPATIONAL THERAPIST

## 2022-03-10 NOTE — PROGRESS NOTES
Subjective: My left middle finger is moving better. Objective:     Current level of performance:  ADL: Independent  Work: Returning to full duty 03/17/2022  Leisure: Family, [place]. Measurements/Tests:  ROM:  Testing By: kiko        MP Middle Left: 0/80  PIP Middle Left: 0/60  DIP Middle Left: 0/40  Edema Middle Left: 180 degrees of HORNER.      Treatment Provided this day: Fluidotherapy to the left wrist and hand: To increase active range of motion, reduce joint stiffness, as well as decrease scar sensitivity, for increased functional use of the involved extremity, during self care, work and or leisure time tasks. Initiated a x 3-step statiic / progressive flexion splint for the LMF: Reviewed it's use. Patient verbalized and demonstrated understanding. HEP was re-addressed. Treatment Time: 30 minutes      Summary/Analysis of Treatment session: Progressing with OT goals and objectives. Plan: Full use of the left hand in x 3 weeks.       Follow up in:  03/15/2022          Radha DE LOS SANTOS/LISETH

## 2022-03-15 ENCOUNTER — OFFICE VISIT (OUTPATIENT)
Dept: SURGERY | Facility: CLINIC | Age: 37
End: 2022-03-15
Payer: MEDICAID

## 2022-03-15 DIAGNOSIS — M62.81 DISTAL MUSCLE WEAKNESS: ICD-10-CM

## 2022-03-15 DIAGNOSIS — M25.642 STIFFNESS OF JOINT, HAND, LEFT: Primary | ICD-10-CM

## 2022-03-15 PROCEDURE — 97035 APP MDLTY 1+ULTRASOUND EA 15: CPT | Performed by: OCCUPATIONAL THERAPIST

## 2022-03-15 PROCEDURE — 97022 WHIRLPOOL THERAPY: CPT | Performed by: OCCUPATIONAL THERAPIST

## 2022-03-15 NOTE — PROGRESS NOTES
Subjective: I am doing better. Objective:     Current level of performance:  ADL: Independent  Work: Returning to work 03/17/2022  Leisure: Family    Measurements/Tests:  ROM:         Not formally assessed this day. Treatment Provided this day: Fluidotherapy to the left wrist and hand: To increase active range of motion, reduce joint stiffness, as well as decrease scar sensitivity, for increased functional use of the involved extremity, during self care, work and or leisure time tasks. Ultrasound to the LMF: 8 min. Continuous 3.3 mhz w/cm squared. To reduce edema, increase circulation, soften scar tissue, for increased range of motion and reduction of pain. Reviewed HEP. Treatment Time: 30 minutes      Summary/Analysis of Treatment session: Slow progression with OT goals and objectives. Plan: Full use of the left hand in x 3 weeks.       Follow up in:  X 1 week          Dannie Baez  OTR/L

## 2022-04-04 ENCOUNTER — APPOINTMENT (OUTPATIENT)
Dept: SURGERY | Facility: CLINIC | Age: 37
End: 2022-04-04
Payer: MEDICAID

## 2022-04-04 ENCOUNTER — HOSPITAL ENCOUNTER (OUTPATIENT)
Dept: GENERAL RADIOLOGY | Facility: HOSPITAL | Age: 37
Discharge: HOME | End: 2022-04-04
Attending: PLASTIC SURGERY
Payer: MEDICAID

## 2022-04-04 ENCOUNTER — OFFICE VISIT (OUTPATIENT)
Dept: SURGERY | Facility: CLINIC | Age: 37
End: 2022-04-04
Payer: MEDICAID

## 2022-04-04 DIAGNOSIS — S62.653A CLOSED NONDISPLACED FRACTURE OF MIDDLE PHALANX OF LEFT MIDDLE FINGER, INITIAL ENCOUNTER: Primary | ICD-10-CM

## 2022-04-04 DIAGNOSIS — S62.653A CLOSED NONDISPLACED FRACTURE OF MIDDLE PHALANX OF LEFT MIDDLE FINGER, INITIAL ENCOUNTER: ICD-10-CM

## 2022-04-04 DIAGNOSIS — M62.81 DISTAL MUSCLE WEAKNESS: Primary | ICD-10-CM

## 2022-04-04 DIAGNOSIS — M25.642 STIFFNESS OF JOINT, HAND, LEFT: ICD-10-CM

## 2022-04-04 PROCEDURE — 99214 OFFICE O/P EST MOD 30 MIN: CPT | Performed by: PLASTIC SURGERY

## 2022-04-04 PROCEDURE — 97110 THERAPEUTIC EXERCISES: CPT | Performed by: OCCUPATIONAL THERAPIST

## 2022-04-04 PROCEDURE — 73140 X-RAY EXAM OF FINGER(S): CPT | Performed by: PLASTIC SURGERY

## 2022-04-21 ENCOUNTER — OFFICE VISIT (OUTPATIENT)
Dept: SURGERY | Facility: CLINIC | Age: 37
End: 2022-04-21
Payer: MEDICAID

## 2022-04-21 DIAGNOSIS — M79.602 PAIN OF LEFT UPPER EXTREMITY: Primary | ICD-10-CM

## 2022-04-21 DIAGNOSIS — M25.642 STIFFNESS OF JOINT, HAND, LEFT: ICD-10-CM

## 2022-04-21 DIAGNOSIS — M62.81 DISTAL MUSCLE WEAKNESS: ICD-10-CM

## 2022-04-21 PROCEDURE — 97035 APP MDLTY 1+ULTRASOUND EA 15: CPT | Performed by: OCCUPATIONAL THERAPIST

## 2022-04-21 PROCEDURE — 97022 WHIRLPOOL THERAPY: CPT | Performed by: OCCUPATIONAL THERAPIST

## 2022-04-21 NOTE — PROGRESS NOTES
Subjective: I still have pain in my LMF. Objective:     Current level of performance:  ADL: Independent  Work: Sleep Study   Leisure: Family    Measurements/Tests:  ROM:  Testing By: kiko        MP Middle Left: 80  PIP Middle Left: 50  DIP Middle Left: 30      Treatment Provided this day: Fluidotherapy to the left wrist and hand: To increase active range of motion, reduce joint stiffness, as well as decrease scar sensitivity, for increased functional use of the involved extremity, during self care, work and or leisure time tasks. Ultrasound to the left middle finger PIP joint: 8 min. Continuous 3.3 mhz w/cm squared. To reduce edema, increase circulation, soften scar tissue, for increased range of motion and reduction of pain. HEP reviewed. Treatment Time: 30 minutes      Summary/Analysis of Treatment session: Slow progression with OT goals and objectives. Plan: Full use of the left hand in x 1 month:       Follow up in:  04/25/2022          Javier DE LOS SANTOS/LISETH

## 2022-04-25 ENCOUNTER — OFFICE VISIT (OUTPATIENT)
Dept: SURGERY | Facility: CLINIC | Age: 37
End: 2022-04-25
Payer: MEDICAID

## 2022-04-25 ENCOUNTER — HOSPITAL ENCOUNTER (OUTPATIENT)
Dept: GENERAL RADIOLOGY | Facility: HOSPITAL | Age: 37
Discharge: HOME | End: 2022-04-25
Attending: PLASTIC SURGERY
Payer: MEDICAID

## 2022-04-25 DIAGNOSIS — M62.81 DISTAL MUSCLE WEAKNESS: Primary | ICD-10-CM

## 2022-04-25 DIAGNOSIS — S62.653A CLOSED NONDISPLACED FRACTURE OF MIDDLE PHALANX OF LEFT MIDDLE FINGER, INITIAL ENCOUNTER: Primary | ICD-10-CM

## 2022-04-25 DIAGNOSIS — M25.642 STIFFNESS OF JOINT, HAND, LEFT: ICD-10-CM

## 2022-04-25 DIAGNOSIS — S62.653A CLOSED NONDISPLACED FRACTURE OF MIDDLE PHALANX OF LEFT MIDDLE FINGER, INITIAL ENCOUNTER: ICD-10-CM

## 2022-04-25 PROCEDURE — 99213 OFFICE O/P EST LOW 20 MIN: CPT | Performed by: PLASTIC SURGERY

## 2022-04-25 PROCEDURE — 73140 X-RAY EXAM OF FINGER(S): CPT | Performed by: PLASTIC SURGERY

## 2022-04-25 PROCEDURE — 97110 THERAPEUTIC EXERCISES: CPT | Performed by: OCCUPATIONAL THERAPIST

## 2022-04-25 NOTE — PROGRESS NOTES
Subjective: I still have discomfort of the LMF middle joint. Objective:     Current level of performance:  ADL: Independent  Work: No restrictions   Leisure: Family    Measurements/Tests:  ROM:  Testing By: kiko   Strength Right: 100 #     MP Middle Left: 100  PIP Middle Left: 0/50/60  DIP Middle Left: 0/40   Strength Left: 50 #      Treatment Provided this day: Up dated LMF objective information: Physician follow-up. Treatment Time: 20 minutes      Summary/Analysis of Treatment session: No further OT needs at this time. Plan: Discontinue OT. Follow up in: To call with questions and or concerns.           Saad Gage  OTR/L

## 2022-09-26 ENCOUNTER — TELEPHONE (OUTPATIENT)
Dept: FAMILY MEDICINE CLINIC | Facility: CLINIC | Age: 37
End: 2022-09-26

## 2022-09-26 NOTE — TELEPHONE ENCOUNTER
Patient spouse is calling to get a letter in regards to TB results to confirm the patient has a clearance to work.

## 2022-09-26 NOTE — TELEPHONE ENCOUNTER
Talked to wife of patient told her message below understood and says that [place] [facility] the [place] instead.

## 2022-10-20 DIAGNOSIS — J01.00 ACUTE NON-RECURRENT MAXILLARY SINUSITIS: ICD-10-CM

## 2022-10-20 RX ORDER — FLUTICASONE PROPIONATE 50 MCG
2 SPRAY, SUSPENSION (ML) NASAL DAILY
Qty: 1 EACH | Refills: 2 | Status: SHIPPED | OUTPATIENT
Start: 2022-10-20 | End: 2023-10-15

## 2022-10-20 NOTE — TELEPHONE ENCOUNTER
Please review. Protocol failed / No protocol. CSS, please assist patient with scheduling annual physical. Thank you. Requested Prescriptions   Pending Prescriptions Disp Refills    fluticasone propionate 50 MCG/ACT Nasal Suspension 1 each 2     Si sprays by Each Nare route daily.         Allergy Medication Protocol Failed - 10/20/2022 10:51 AM        Failed - In person appointment or virtual visit in the past 12 mos or appointment in next 3 mos       Recent Outpatient Visits              5 months ago Closed nondisplaced fracture of middle phalanx of left middle finger, initial encounter    [address],[address], [address], Aranza Cm MD    Office Visit    5 months ago Distal muscle weakness    [facility],[address], [address], [address] [facility]    Office Visit    6 months ago Pain of left upper extremity    [facility],[address], [address], [address] [facility]    Office Visit    6 months ago Closed nondisplaced fracture of middle phalanx of left middle finger, initial encounter    Aranza Nix MD    Office Visit    7 months ago Stiffness of joint, hand, left    [place] Nix, [facility]    Office Visit                       Recent Outpatient Visits              5 months ago Closed nondisplaced fracture of middle phalanx of left middle finger, initial encounter    Aranza Nix MD    Office Visit    5 months ago Distal muscle weakness    [address] [facility],[address], [address], Victoria Longo, [facility]    Office Visit    6 months ago Pain of left upper extremity    [facility],[address], [address], [address] [place], [place]    Office Visit    6 months ago Closed nondisplaced fracture of middle phalanx of left middle finger, initial encounter    [address] [facility],[address], [address] [facility] [address], Rani Gurrola MD    Office Visit    7 months ago Stiffness of joint, hand, left    [facility], [address], [place], [place]    Office Visit

## 2022-11-28 ENCOUNTER — OFFICE VISIT (OUTPATIENT)
Dept: FAMILY MEDICINE CLINIC | Facility: CLINIC | Age: 37
End: 2022-11-28
Payer: MEDICAID

## 2022-11-28 VITALS
DIASTOLIC BLOOD PRESSURE: 88 MMHG | BODY MASS INDEX: 36.94 KG/M2 | WEIGHT: 258 LBS | HEART RATE: 90 BPM | HEIGHT: 70 IN | SYSTOLIC BLOOD PRESSURE: 124 MMHG

## 2022-11-28 DIAGNOSIS — Z00.00 PHYSICAL EXAM: Primary | ICD-10-CM

## 2022-11-28 DIAGNOSIS — E66.09 CLASS 2 OBESITY DUE TO EXCESS CALORIES WITHOUT SERIOUS COMORBIDITY WITH BODY MASS INDEX (BMI) OF 37.0 TO 37.9 IN ADULT: ICD-10-CM

## 2022-11-28 DIAGNOSIS — R51.9 SINUS HEADACHE: ICD-10-CM

## 2022-12-09 ENCOUNTER — LAB ENCOUNTER (OUTPATIENT)
Dept: LAB | Facility: HOSPITAL | Age: 37
End: 2022-12-09
Attending: FAMILY MEDICINE
Payer: MEDICAID

## 2022-12-09 DIAGNOSIS — Z00.00 PHYSICAL EXAM: ICD-10-CM

## 2022-12-09 LAB
ALBUMIN SERPL-MCNC: 3.5 G/DL (ref 3.4–5)
ALBUMIN/GLOB SERPL: 1 {RATIO} (ref 1–2)
ALP LIVER SERPL-CCNC: 68 U/L
ALT SERPL-CCNC: 41 U/L
ANION GAP SERPL CALC-SCNC: 4 MMOL/L (ref 0–18)
AST SERPL-CCNC: 22 U/L (ref 15–37)
BASOPHILS # BLD AUTO: 0.03 X10(3) UL (ref 0–0.2)
BASOPHILS NFR BLD AUTO: 0.5 %
BILIRUB SERPL-MCNC: 0.4 MG/DL (ref 0.1–2)
BILIRUB UR QL: NEGATIVE
BUN BLD-MCNC: 11 MG/DL (ref 7–18)
BUN/CREAT SERPL: 11.8 (ref 10–20)
CALCIUM BLD-MCNC: 8.9 MG/DL (ref 8.5–10.1)
CHLORIDE SERPL-SCNC: 104 MMOL/L (ref 98–112)
CHOLEST SERPL-MCNC: 160 MG/DL (ref ?–200)
CLARITY UR: CLEAR
CO2 SERPL-SCNC: 31 MMOL/L (ref 21–32)
COLOR UR: YELLOW
CREAT BLD-MCNC: 0.93 MG/DL
DEPRECATED RDW RBC AUTO: 38.9 FL (ref 35.1–46.3)
EOSINOPHIL # BLD AUTO: 0.11 X10(3) UL (ref 0–0.7)
EOSINOPHIL NFR BLD AUTO: 1.7 %
ERYTHROCYTE [DISTWIDTH] IN BLOOD BY AUTOMATED COUNT: 12.6 % (ref 11–15)
EST. AVERAGE GLUCOSE BLD GHB EST-MCNC: 120 MG/DL (ref 68–126)
FASTING PATIENT LIPID ANSWER: YES
FASTING STATUS PATIENT QL REPORTED: YES
GFR SERPLBLD BASED ON 1.73 SQ M-ARVRAT: 107 ML/MIN/1.73M2 (ref 60–?)
GLOBULIN PLAS-MCNC: 3.5 G/DL (ref 2.8–4.4)
GLUCOSE BLD-MCNC: 126 MG/DL (ref 70–99)
GLUCOSE UR-MCNC: NEGATIVE MG/DL
HBA1C MFR BLD: 5.8 % (ref ?–5.7)
HCT VFR BLD AUTO: 45.1 %
HDLC SERPL-MCNC: 48 MG/DL (ref 40–59)
HGB BLD-MCNC: 14.7 G/DL
HGB UR QL STRIP.AUTO: NEGATIVE
IMM GRANULOCYTES # BLD AUTO: 0.02 X10(3) UL (ref 0–1)
IMM GRANULOCYTES NFR BLD: 0.3 %
KETONES UR-MCNC: NEGATIVE MG/DL
LDLC SERPL CALC-MCNC: 92 MG/DL (ref ?–100)
LEUKOCYTE ESTERASE UR QL STRIP.AUTO: NEGATIVE
LYMPHOCYTES # BLD AUTO: 2.16 X10(3) UL (ref 1–4)
LYMPHOCYTES NFR BLD AUTO: 32.9 %
MCH RBC QN AUTO: 27.8 PG (ref 26–34)
MCHC RBC AUTO-ENTMCNC: 32.6 G/DL (ref 31–37)
MCV RBC AUTO: 85.3 FL
MONOCYTES # BLD AUTO: 0.43 X10(3) UL (ref 0.1–1)
MONOCYTES NFR BLD AUTO: 6.6 %
NEUTROPHILS # BLD AUTO: 3.81 X10 (3) UL (ref 1.5–7.7)
NEUTROPHILS # BLD AUTO: 3.81 X10(3) UL (ref 1.5–7.7)
NEUTROPHILS NFR BLD AUTO: 58 %
NITRITE UR QL STRIP.AUTO: NEGATIVE
NONHDLC SERPL-MCNC: 112 MG/DL (ref ?–130)
OSMOLALITY SERPL CALC.SUM OF ELEC: 289 MOSM/KG (ref 275–295)
PH UR: 5.5 [PH] (ref 5–8)
PLATELET # BLD AUTO: 125 10(3)UL (ref 150–450)
POTASSIUM SERPL-SCNC: 4.3 MMOL/L (ref 3.5–5.1)
PROT SERPL-MCNC: 7 G/DL (ref 6.4–8.2)
PROT UR-MCNC: NEGATIVE MG/DL
RBC # BLD AUTO: 5.29 X10(6)UL
SODIUM SERPL-SCNC: 139 MMOL/L (ref 136–145)
SP GR UR STRIP: 1.02 (ref 1–1.03)
TRIGL SERPL-MCNC: 110 MG/DL (ref 30–149)
TSI SER-ACNC: 1.75 MIU/ML (ref 0.36–3.74)
UROBILINOGEN UR STRIP-ACNC: 0.2
VIT D+METAB SERPL-MCNC: 14.8 NG/ML (ref 30–100)
VLDLC SERPL CALC-MCNC: 18 MG/DL (ref 0–30)
WBC # BLD AUTO: 6.6 X10(3) UL (ref 4–11)

## 2022-12-09 PROCEDURE — 85025 COMPLETE CBC W/AUTO DIFF WBC: CPT

## 2022-12-09 PROCEDURE — 80061 LIPID PANEL: CPT

## 2022-12-09 PROCEDURE — 84443 ASSAY THYROID STIM HORMONE: CPT

## 2022-12-09 PROCEDURE — 83036 HEMOGLOBIN GLYCOSYLATED A1C: CPT

## 2022-12-09 PROCEDURE — 80053 COMPREHEN METABOLIC PANEL: CPT

## 2022-12-09 PROCEDURE — 82306 VITAMIN D 25 HYDROXY: CPT

## 2022-12-09 PROCEDURE — 81003 URINALYSIS AUTO W/O SCOPE: CPT

## 2022-12-09 PROCEDURE — 36415 COLL VENOUS BLD VENIPUNCTURE: CPT

## 2022-12-09 RX ORDER — ERGOCALCIFEROL 1.25 MG/1
50000 CAPSULE ORAL WEEKLY
Qty: 12 CAPSULE | Refills: 4 | Status: SHIPPED | OUTPATIENT
Start: 2022-12-09 | End: 2023-01-08

## 2022-12-12 ENCOUNTER — TELEPHONE (OUTPATIENT)
Dept: ADMINISTRATIVE | Age: 37
End: 2022-12-12

## 2022-12-12 NOTE — TELEPHONE ENCOUNTER
Please call patient to set up an appointment with me, start treatment for sinusitis, if symptoms persist,  before retrying sinus imaging.

## 2022-12-12 NOTE — TELEPHONE ENCOUNTER
Hello,    Ordered CT SINUS (CPT=70486) has been denied by payer, Sandra on 12/09/2022. Please note supporting clinical office notes and other available evidence was provided to payer prior to determination. Denial Rationale: \"You must have one of the following.  -Failure to improve following a four week trial of antibiotics and/or allergy treatment.  -Your doctor is concerned that you have a complex swelling of your sinuses   (complicated sinusitis). -Four or more episodes within the past 12 months without signs or symptoms between   them. It must be noted that you have chronic sinusitis (a problem with your sinuses being   inflamed for more than 12 weeks). You must have at least two of the following signs and symptoms.  -Fluid containing mucus and pus secreting from your nose.  -A blockage or congestion in your nose. -Pain, pressure, or fullness in your face.  -A decreased sense of smell. \"    Case remains [id number] eligible until und of business 12/16/2022. To schedule a Bpjp-ze-Pilz, please call Sandra at [phone], select Option 4. Reference number [id number] will need to be entered or provided. Case remains first level appeal eligible for the next 57 calendar days. Appeal information may be found within denial letter provided below. Please advise,  Thank you!     Denial Letter:

## 2022-12-31 ENCOUNTER — OFFICE VISIT (OUTPATIENT)
Dept: FAMILY MEDICINE CLINIC | Facility: CLINIC | Age: 37
End: 2022-12-31
Payer: MEDICAID

## 2022-12-31 VITALS
WEIGHT: 253 LBS | BODY MASS INDEX: 36.22 KG/M2 | SYSTOLIC BLOOD PRESSURE: 115 MMHG | HEART RATE: 88 BPM | HEIGHT: 70 IN | DIASTOLIC BLOOD PRESSURE: 83 MMHG

## 2022-12-31 DIAGNOSIS — S62.609G: ICD-10-CM

## 2022-12-31 DIAGNOSIS — E55.9 VITAMIN D DEFICIENCY: ICD-10-CM

## 2022-12-31 DIAGNOSIS — R73.03 PREDIABETES: Primary | ICD-10-CM

## 2022-12-31 PROCEDURE — 99213 OFFICE O/P EST LOW 20 MIN: CPT | Performed by: FAMILY MEDICINE

## 2022-12-31 PROCEDURE — 3074F SYST BP LT 130 MM HG: CPT | Performed by: FAMILY MEDICINE

## 2022-12-31 PROCEDURE — 3008F BODY MASS INDEX DOCD: CPT | Performed by: FAMILY MEDICINE

## 2022-12-31 PROCEDURE — 3079F DIAST BP 80-89 MM HG: CPT | Performed by: FAMILY MEDICINE

## 2023-05-06 ENCOUNTER — APPOINTMENT (OUTPATIENT)
Dept: GENERAL RADIOLOGY | Age: 38
End: 2023-05-06
Attending: NURSE PRACTITIONER
Payer: MEDICAID

## 2023-05-06 ENCOUNTER — HOSPITAL ENCOUNTER (OUTPATIENT)
Age: 38
Discharge: HOME | End: 2023-05-06
Payer: MEDICAID

## 2023-05-06 VITALS
SYSTOLIC BLOOD PRESSURE: 125 MMHG | DIASTOLIC BLOOD PRESSURE: 81 MMHG | TEMPERATURE: 97 F | RESPIRATION RATE: 16 BRPM | OXYGEN SATURATION: 97 % | HEART RATE: 77 BPM

## 2023-05-06 DIAGNOSIS — S93.401A MILD SPRAIN OF RIGHT ANKLE, INITIAL ENCOUNTER: Primary | ICD-10-CM

## 2023-05-06 PROCEDURE — 73610 X-RAY EXAM OF ANKLE: CPT | Performed by: NURSE PRACTITIONER

## 2023-05-06 PROCEDURE — 73630 X-RAY EXAM OF FOOT: CPT | Performed by: NURSE PRACTITIONER

## 2023-05-06 PROCEDURE — L4350 ANKLE CONTROL ORTHO PRE OTS: HCPCS | Performed by: NURSE PRACTITIONER

## 2023-05-06 PROCEDURE — 99213 OFFICE O/P EST LOW 20 MIN: CPT | Performed by: NURSE PRACTITIONER

## 2023-05-06 RX ORDER — IBUPROFEN 600 MG/1
600 TABLET ORAL ONCE
Status: COMPLETED | OUTPATIENT
Start: 2023-05-06 | End: 2023-05-06

## 2023-05-18 ENCOUNTER — OFFICE VISIT (OUTPATIENT)
Dept: FAMILY MEDICINE CLINIC | Facility: CLINIC | Age: 38
End: 2023-05-18

## 2023-05-18 VITALS
BODY MASS INDEX: 35.79 KG/M2 | DIASTOLIC BLOOD PRESSURE: 86 MMHG | HEART RATE: 90 BPM | SYSTOLIC BLOOD PRESSURE: 122 MMHG | WEIGHT: 250 LBS | HEIGHT: 70 IN

## 2023-05-18 DIAGNOSIS — S93.401D SPRAIN OF RIGHT ANKLE, UNSPECIFIED LIGAMENT, SUBSEQUENT ENCOUNTER: ICD-10-CM

## 2023-05-18 DIAGNOSIS — H10.31 ACUTE BACTERIAL CONJUNCTIVITIS OF RIGHT EYE: Primary | ICD-10-CM

## 2023-05-18 RX ORDER — TOBRAMYCIN 3 MG/ML
2 SOLUTION/ DROPS OPHTHALMIC 3 TIMES DAILY
Qty: 5 ML | Refills: 0 | Status: SHIPPED | OUTPATIENT
Start: 2023-05-18 | End: 2023-05-25

## 2023-07-13 NOTE — LETTER
10/22/2020              Raza Saab        [address] [facility] [address]         To Whom It May Concern,    My patient, Simran Mejía, was seen and evaluated on 10/22/2020.    He developed symptoms on 10/14/2020, so it was advise You can access the FollowMyHealth Patient Portal offered by [facility] by registering at the following website: [email or web address]. By joining [facility]’s FollowMyHealth portal, you will also be able to view your health information using other applications (apps) compatible with our system.

## 2023-10-09 ENCOUNTER — APPOINTMENT (OUTPATIENT)
Dept: GENERAL RADIOLOGY | Age: 38
End: 2023-10-09
Attending: NURSE PRACTITIONER
Payer: MEDICAID

## 2023-10-09 ENCOUNTER — HOSPITAL ENCOUNTER (OUTPATIENT)
Age: 38
Discharge: HOME | End: 2023-10-09
Payer: MEDICAID

## 2023-10-09 VITALS
DIASTOLIC BLOOD PRESSURE: 91 MMHG | HEART RATE: 76 BPM | SYSTOLIC BLOOD PRESSURE: 133 MMHG | OXYGEN SATURATION: 100 % | TEMPERATURE: 97 F | RESPIRATION RATE: 18 BRPM

## 2023-10-09 DIAGNOSIS — M25.562 ACUTE PAIN OF LEFT KNEE: Primary | ICD-10-CM

## 2023-10-09 PROCEDURE — E0114 CRUTCH UNDERARM PAIR NO WOOD: HCPCS | Performed by: NURSE PRACTITIONER

## 2023-10-09 PROCEDURE — 99213 OFFICE O/P EST LOW 20 MIN: CPT | Performed by: NURSE PRACTITIONER

## 2023-10-09 PROCEDURE — 73562 X-RAY EXAM OF KNEE 3: CPT | Performed by: NURSE PRACTITIONER

## 2023-10-09 PROCEDURE — L1830 KO IMMOB CANVAS LONG PRE OTS: HCPCS | Performed by: NURSE PRACTITIONER

## 2023-10-09 RX ORDER — METHYLPREDNISOLONE 4 MG/1
TABLET ORAL
Qty: 1 EACH | Refills: 0 | Status: SHIPPED | OUTPATIENT
Start: 2023-10-09

## 2023-10-09 RX ORDER — NAPROXEN 500 MG/1
500 TABLET ORAL 2 TIMES DAILY WITH MEALS
Qty: 20 TABLET | Refills: 0 | Status: SHIPPED | OUTPATIENT
Start: 2023-10-09 | End: 2023-10-19

## 2023-10-09 NOTE — DISCHARGE INSTRUCTIONS
As discussed, x-ray negative for fracture. Positive for knee effusion. Medication of naproxen and Medrol Dosepak prescribed. Take naproxen twice a day for 10 days with food and water. Do not take any other NSAID such as Motrin, Advil, Aleve, ibuprofen. Tylenol okay for breakthrough pain. Follow directions regarding Medrol Dosepak, will be a taper dose over the next 6 days. Recommend you take with food and water in the morning. RICE therapy as discussed: Rest, ice, compression, elevation. Wear knee immobilizer while up awake alert and active, remove while sleeping and resting. Apply ice while sleeping and restin times a day for at least 15 minutes. Crutches to avoid full weightbearing activity. Please follow-up with referred specialist.    To ER for any worsening pain, redness, swelling, inability to fully bend or extend knee.

## 2023-11-26 ENCOUNTER — HOSPITAL ENCOUNTER (OUTPATIENT)
Age: 38
Discharge: HOME | End: 2023-11-26
Payer: MEDICAID

## 2023-11-26 VITALS
SYSTOLIC BLOOD PRESSURE: 140 MMHG | TEMPERATURE: 98 F | RESPIRATION RATE: 16 BRPM | OXYGEN SATURATION: 99 % | HEART RATE: 84 BPM | DIASTOLIC BLOOD PRESSURE: 91 MMHG

## 2023-11-26 DIAGNOSIS — R03.0 ELEVATED BP WITHOUT DIAGNOSIS OF HYPERTENSION: ICD-10-CM

## 2023-11-26 DIAGNOSIS — Z20.822 COVID-19 RULED OUT BY LABORATORY TESTING: ICD-10-CM

## 2023-11-26 DIAGNOSIS — J06.9 VIRAL URI: Primary | ICD-10-CM

## 2023-11-26 LAB — SARS-COV-2 RNA RESP QL NAA+PROBE: NOT DETECTED

## 2023-11-26 NOTE — DISCHARGE INSTRUCTIONS
Please drink plenty of fluids  Steam showers  Rest!  Mucinex DM twice per day for 7 days, with plenty of fluids  For chest pain, shortness of breath or worsening symptoms, please go to ER  Please see your primary care provider if no improvement in 5-7 days

## 2023-12-07 ENCOUNTER — OFFICE VISIT (OUTPATIENT)
Dept: FAMILY MEDICINE CLINIC | Facility: CLINIC | Age: 38
End: 2023-12-07

## 2023-12-07 VITALS
HEART RATE: 90 BPM | DIASTOLIC BLOOD PRESSURE: 85 MMHG | BODY MASS INDEX: 37.71 KG/M2 | WEIGHT: 263.38 LBS | SYSTOLIC BLOOD PRESSURE: 126 MMHG | HEIGHT: 70 IN

## 2023-12-07 DIAGNOSIS — M25.562 ACUTE PAIN OF LEFT KNEE: ICD-10-CM

## 2023-12-07 DIAGNOSIS — R03.0 BORDERLINE HIGH BLOOD PRESSURE: ICD-10-CM

## 2023-12-07 PROCEDURE — 3008F BODY MASS INDEX DOCD: CPT | Performed by: FAMILY MEDICINE

## 2023-12-07 PROCEDURE — 99213 OFFICE O/P EST LOW 20 MIN: CPT | Performed by: FAMILY MEDICINE

## 2023-12-07 PROCEDURE — 3079F DIAST BP 80-89 MM HG: CPT | Performed by: FAMILY MEDICINE

## 2023-12-07 PROCEDURE — 3074F SYST BP LT 130 MM HG: CPT | Performed by: FAMILY MEDICINE

## 2024-01-02 ENCOUNTER — OFFICE VISIT (OUTPATIENT)
Dept: ORTHOPEDICS CLINIC | Facility: CLINIC | Age: 39
End: 2024-01-02
Payer: MEDICAID

## 2024-01-02 VITALS — HEIGHT: 70 IN | WEIGHT: 269.19 LBS | BODY MASS INDEX: 38.54 KG/M2

## 2024-01-02 DIAGNOSIS — M22.41 CHONDROMALACIA OF RIGHT PATELLOFEMORAL JOINT: ICD-10-CM

## 2024-01-02 DIAGNOSIS — S83.232D COMPLEX TEAR OF MEDIAL MENISCUS OF LEFT KNEE AS CURRENT INJURY, SUBSEQUENT ENCOUNTER: Primary | ICD-10-CM

## 2024-01-02 PROCEDURE — 3008F BODY MASS INDEX DOCD: CPT | Performed by: ORTHOPAEDIC SURGERY

## 2024-01-02 PROCEDURE — 99203 OFFICE O/P NEW LOW 30 MIN: CPT | Performed by: ORTHOPAEDIC SURGERY

## 2024-01-02 NOTE — H&P
NURSING INTAKE COMMENTS:   Chief Complaint   Patient presents with    Knee Pain     Consult- L knee-onset-2 mos ago- no injury- rates pain 1/10 all the time- stated has limited mobility and swelling- has xray in the system       HPI: This 38 year old male presents today to discuss the left knee.  About 2 months ago, he got up from praying and felt pain on the medial left knee.  It persisted but over the last 2 months has improved significantly.  He denies catching or locking.  He denies instability or prior problems with the left knee.    He has a newer problem on the right knee with some crepitus to the patellofemoral joint.  He notices this with getting up from the kneeling position as well.    His medical history significant for little bit of weight gain over the last 2 years but other than that he is very healthy man.  He works as a  in an office at a desk.  He lives with his wife and 3 children ages 15, 10, and 6.  He plays occasional volleyball for exercise.  She is a non-smoker.    Past Medical History:   Diagnosis Date    Lichen planus      History reviewed. No pertinent surgical history.  Current Outpatient Medications   Medication Sig Dispense Refill    methylPREDNISolone (MEDROL) 4 MG Oral Tablet Therapy Pack Dosepack: take as directed (Patient not taking: Reported on 12/7/2023) 1 each 0     No Known Allergies  Family History   Problem Relation Age of Onset    Hypertension Father     Diabetes Father     Other (Other) Father         PSORIASIS    Stroke Mother     Diabetes Mother     Hypertension Mother     Diabetes Sister     Diabetes Brother     Hypertension Other         Paternal Family h/o Hypertension     No family Hx of DVT/PE    Social History     Occupational History    Not on file   Tobacco Use    Smoking status: Never    Smokeless tobacco: Never   Substance and Sexual Activity    Alcohol use: No    Drug use: No    Sexual activity: Not on file        Review of Systems:  GENERAL: feels  generally well, no significant weight loss or weight gain  SKIN: no ulcerated or worrisome skin lesions  EYES:denies blurred vision or double vision  HEENT: denies new nasal congestion, sinus pain or ST  LUNGS: denies shortness of breath  CARDIOVASCULAR: denies chest pain  GI: no hematemesis, no worsening heartburn, no diarrhea  : no dysuria, no blood in urine, no difficulty urinating, no incontinence  MUSCULOSKELETAL: no other musculoskeletal complaints other than in HPI  NEURO: no numbness or tingling, no weakness or balance disorder  PSYCHE: no depression or anxiety  HEMATOLOGIC: no hx of blood dyscrasia, no Hx DVT/PE  ENDOCRINE: no thyroid or diabetes issues  ALL/ASTHMA: no new hx of severe allergy or asthma    Physical Examination:    Ht 5' 10\" (1.778 m)   Wt 269 lb 3.2 oz (122.1 kg)   BMI 38.63 kg/m²   Constitutional: appears well hydrated, alert and responsive, no acute distress noted  Extremities: Neither knee had asymmetric warmth or effusion.  The skin on his legs was healthy without calf tenderness or pitting edema.  Musculoskeletal: Both knees had full motion 0 to 130 degrees without instability.  Left knee with very minimal tenderness to the posterior medial joint line on deep flexion but it was very minimal at best.  The knee was otherwise benign with good stability and no tenderness elsewhere.  No patellofemoral crepitus or pain.    The right knee was also relatively benign except for some mild patellofemoral crepitus without much pain.  No medial or lateral joint line tenderness.  No instability.  Neurological: Normal motor and sensory bilateral lower extremities.    Imaging: X-rays of the left knee were normal.      No results found.     Lab Results   Component Value Date    WBC 6.6 12/09/2022    HGB 14.7 12/09/2022    .0 (L) 12/09/2022      Lab Results   Component Value Date     (H) 12/09/2022    BUN 11 12/09/2022    CREATSERUM 0.93 12/09/2022    GFRNAA 108 07/09/2021    GFRAA 125  07/09/2021        Assessment and Plan:  Diagnoses and all orders for this visit:    Complex tear of medial meniscus of left knee as current injury, subsequent encounter    Chondromalacia of right patellofemoral joint        Assessment: Likely medial meniscus tear left knee but currently with few symptoms.  Patellofemoral chondromalacia right knee.    Plan: For the right knee I recommended significant weight loss and we talked about the fulcrum effect of the patella which could magnify the amount of weight gain.    For the left knee we discussed MRI but he feels he is doing well enough at this point that we can observe.  If the left knee becomes more symptomatic, I told him to call the office and we would order MRI left knee and then follow in office for results.  If he is doing well for both knees, I will see him as needed.    Follow Up: No follow-ups on file.    Darío Partida MD

## 2024-04-13 ENCOUNTER — HOSPITAL ENCOUNTER (OUTPATIENT)
Age: 39
Discharge: HOME | End: 2024-04-13
Payer: MEDICAID

## 2024-04-13 VITALS
HEART RATE: 77 BPM | RESPIRATION RATE: 16 BRPM | DIASTOLIC BLOOD PRESSURE: 86 MMHG | SYSTOLIC BLOOD PRESSURE: 123 MMHG | TEMPERATURE: 97 F | OXYGEN SATURATION: 97 %

## 2024-04-13 DIAGNOSIS — L02.412 ABSCESS OF AXILLA, LEFT: Primary | ICD-10-CM

## 2024-04-13 PROCEDURE — 10061 I&D ABSCESS COMP/MULTIPLE: CPT

## 2024-04-13 PROCEDURE — 99213 OFFICE O/P EST LOW 20 MIN: CPT

## 2024-04-13 RX ORDER — LIDOCAINE HYDROCHLORIDE 10 MG/ML
10 INJECTION, SOLUTION EPIDURAL; INFILTRATION; INTRACAUDAL; PERINEURAL ONCE
Status: COMPLETED | OUTPATIENT
Start: 2024-04-13 | End: 2024-04-13

## 2024-04-13 RX ORDER — CEFADROXIL 500 MG/1
500 CAPSULE ORAL 2 TIMES DAILY
Qty: 14 CAPSULE | Refills: 0 | Status: SHIPPED | OUTPATIENT
Start: 2024-04-13 | End: 2024-04-20

## 2024-04-13 NOTE — ED INITIAL ASSESSMENT (HPI)
Pt with possible abscess to L axilla x3 days. Pt stated that it is painful to touch. Pt denied fevers.

## 2024-04-13 NOTE — DISCHARGE INSTRUCTIONS
Please keep the packing in place for 2 days, you can take it out in 2 days and keep the area clean.  You can change the gauze dressing if it gets saturated.    You can use warm compresses for 2-3 times a day to help promote more drainage from the wound.  Take the antibiotics as prescribed.  If you develop any fever, worsening pain, or any other concerning complaints you should go to the emergency department.  Follow-up with your primary care doctor in 2 days for a wound check.  Follow-up with the dermatologist if you have persistent redness to the area.

## 2024-04-13 NOTE — ED PROVIDER NOTES
Patient Seen in: [facility]      History     Chief Complaint   Patient presents with    Skin Problem     Stated Complaint: bump on arm    Subjective:   Raza is a 39-year-old male presenting to the immediate care complaining of a bump under his left axilla.  Patient states that is been there for about 2 to 3 days.  States that it is becoming more painful and swollen.  Denies any fever, weakness, dizziness, chest pain, shortness of breath, abdominal pain or vomiting.  Patient states he is otherwise been feeling well.  Has any history of recurrent abscesses.  Patient denies any other concerns or complaints.            Objective:   Past Medical History:    Lichen planus              History reviewed. No pertinent surgical history.             Social History     Socioeconomic History    Marital status:    Tobacco Use    Smoking status: Never    Smokeless tobacco: Never   Substance and Sexual Activity    Alcohol use: No    Drug use: No   Other Topics Concern    Caffeine Concern Yes     Comment: Soda, rarely    Right Handed Yes              Review of Systems    Positive for stated complaint: bump on arm  Other systems are as noted in HPI.  Constitutional and vital signs reviewed.      All other systems reviewed and negative except as noted above.    Physical Exam     ED Triage Vitals [04/13/24 1008]   /86   Pulse 77   Resp 16   Temp 97.4 °F (36.3 °C)   Temp src Oral   SpO2 97 %   O2 Device None (Room air)       Current:/86   Pulse 77   Temp 97.4 °F (36.3 °C) (Oral)   Resp 16   SpO2 97%         Physical Exam  Vitals and nursing note reviewed.   Constitutional:       General: He is not in acute distress.     Appearance: Normal appearance. He is not ill-appearing, toxic-appearing or diaphoretic.   HENT:      Head: Normocephalic.   Cardiovascular:      Rate and Rhythm: Normal rate.   Pulmonary:      Effort: Pulmonary effort is normal.   Musculoskeletal:         General: Normal range of  motion.      Cervical back: Normal range of motion.   Skin:     General: Skin is warm and dry.      Capillary Refill: Capillary refill takes less than 2 seconds.      Comments: Patient has a 6 cm x 2 cm abscess to the left axilla with fluctuant center.  There is mild induration on the parameter.  There is also some mild surrounding erythema.  The entire area is tender.   Neurological:      General: No focal deficit present.      Mental Status: He is alert and oriented to person, place, and time.   Psychiatric:         Mood and Affect: Mood normal.         Behavior: Behavior normal.         Thought Content: Thought content normal.         Judgment: Judgment normal.              ED Course     Labs Reviewed   AEROBIC BACTERIAL CULTURE       Procedure Note:  Risks and benefits of procedure discussed.   Area prepped and draped.  1 %lidocaine with epi 5 cc injected circumferentially.  1 cm incision to abscess site made with #11 blade, all loculations broken.  30 cc yellow pus drained.  15 cm of iodoform in one continous strip packed in wound.  Dressing applied.  Pt tolerated procedure well.  Wound culture sent.         MDM           Medical Decision Making  Multiple medical diagnoses were considered including but not limited to abscess, cellulitis, hidradenitis.  Patient is well appearing, non-toxic and in no acute distress.  Vital signs are stable.   Patient's history and physical exam are consistent with a left axilla abscess.  Incision and drainage as above.  Recommended that patient keep the packing in place for 2 days, take it out in 2 days and keep the area clean.  Recommended compresses for 2-3 times a day to help promote more drainage from the wound.  Send a prescription for cefadroxil for surrounding cellulitis.  Recommended that if the patient develop any fever, worsening pain, or any other concerning complaints they should go to the emergency department.  Recommended follow-up with primary care doctor in 2 days  for a wound check.  Recommended follow-up with the dermatologist if the patient has persistent redness to the area.   ED precautions discussed.  Patient advised to follow up with PCP in 2-3 days.  Patient agrees with this plan of care.  Patient verbalizes understanding of discharge instructions and plan of care.      Amount and/or Complexity of Data Reviewed  Labs: ordered. Decision-making details documented in ED Course.    Risk  OTC drugs.  Prescription drug management.        Disposition and Plan     Clinical Impression:  1. Abscess of axilla, left         Disposition:  Discharge  4/13/2024 10:37 am    Follow-up:  Freeman Rogers MD  [address] [place] [address] [address]  [place] [address]  [phone]          Wali Shaver DO  [address] [facility] [address]  [place] [address]  [phone]                Medications Prescribed:  Discharge Medication List as of 4/13/2024 10:38 AM        START taking these medications    Details   cefadroxil 500 MG Oral Cap Take 1 capsule (500 mg total) by mouth 2 (two) times daily for 7 days., Normal, Disp-14 capsule, R-0

## 2024-05-23 ENCOUNTER — OFFICE VISIT (OUTPATIENT)
Dept: FAMILY MEDICINE CLINIC | Facility: CLINIC | Age: 39
End: 2024-05-23

## 2024-05-23 VITALS
DIASTOLIC BLOOD PRESSURE: 88 MMHG | SYSTOLIC BLOOD PRESSURE: 123 MMHG | WEIGHT: 259.19 LBS | HEART RATE: 98 BPM | BODY MASS INDEX: 37.11 KG/M2 | HEIGHT: 70 IN

## 2024-05-23 DIAGNOSIS — J34.2 DEVIATED SEPTUM: ICD-10-CM

## 2024-05-23 DIAGNOSIS — J35.3 ENLARGED TONSILS AND ADENOIDS: ICD-10-CM

## 2024-05-23 DIAGNOSIS — Z00.00 ENCOUNTER FOR WELLNESS EXAMINATION IN ADULT: Primary | ICD-10-CM

## 2024-05-23 DIAGNOSIS — L73.8 FOLLICULITIS BARBAE: ICD-10-CM

## 2024-05-23 DIAGNOSIS — G47.33 SLEEP APNEA, OBSTRUCTIVE: ICD-10-CM

## 2024-05-23 DIAGNOSIS — E66.09 CLASS 2 OBESITY DUE TO EXCESS CALORIES WITHOUT SERIOUS COMORBIDITY WITH BODY MASS INDEX (BMI) OF 37.0 TO 37.9 IN ADULT: ICD-10-CM

## 2024-05-23 DIAGNOSIS — L02.412 ABSCESS OF AXILLA, LEFT: ICD-10-CM

## 2024-05-23 DIAGNOSIS — B07.0 PLANTAR WART: ICD-10-CM

## 2024-05-23 PROBLEM — E66.812 CLASS 2 OBESITY DUE TO EXCESS CALORIES WITHOUT SERIOUS COMORBIDITY WITH BODY MASS INDEX (BMI) OF 37.0 TO 37.9 IN ADULT: Status: ACTIVE | Noted: 2024-05-23

## 2024-05-23 PROBLEM — B35.0 TINEA BARBAE: Status: ACTIVE | Noted: 2024-05-23

## 2024-05-23 NOTE — ASSESSMENT & PLAN NOTE
Encouraged diet and exercise.  Eat well-balanced diet, cut back on carbohydrates, practice portion control.  Exercise regimen should be 3-5 times per week for at least 30 minutes.  Keep walking!  Assess TSH and A1c family history of diabetes

## 2024-05-23 NOTE — ASSESSMENT & PLAN NOTE
Completed course of antibiotics cefadroxil and incision and drainage in urgent care end of May.  Encouraged warm compresses twice daily To facilitate drainage  Keep the area dry and clean.

## 2024-05-23 NOTE — ASSESSMENT & PLAN NOTE
Issues with sinus headaches and poor air movement based on what direction his head is facing.  Requesting referral to see ENT  Referral placed to see Amy Nesbitt

## 2024-05-23 NOTE — ASSESSMENT & PLAN NOTE
Folliculitis barbae to posterior neck  Trial hydrocortisone 1% over-the-counter  Follow-up as needed

## 2024-05-23 NOTE — ASSESSMENT & PLAN NOTE
Swollen tonsils and adenoids.   Baseline for patient would like to be evaluated by ENT.  Reports snoring with apnea.  Sleep referral ordered with MD Beasley

## 2024-05-23 NOTE — ASSESSMENT & PLAN NOTE
Wellness labs ordered.  Encouraged increasing physical activity which includes raising heart rate 3 to 5 days/week for at least 30 minutes at a time, while also performing mild strength exercises.  Patient counseled on importance of dietary modifications, which may include limiting fats, red meat, and carbohydrates, while increasing fruit and vegetable intake, and trying to adhere to a low sodium/Mediterranean diet.  Encouraged to manage stress.  Encouraged good sleep habits.  Encouraged good sexual health.    Patient aware of plan of care. All questions answered to satisfaction of the patient. Patient instructed to call office or reach out via [employer] if any issues arise. For urgent issues and/or reviewed red flags please proceed to the urgent care or ER.  Also, inform the nurse practitioner with any new symptoms or medication side effects.

## 2024-05-23 NOTE — PROGRESS NOTES
Subjective:   Raza Saab is a 39 year old male who presents for Routine Physical   Patient is a pleasant 39-year-old male with past medical history as history consistent for eczema.  Patient presents to office today for physical.  Patient states his health is good, no complaints. Feels like he needs to lose weight. Now has decreased stamina.     Diet: Eats well. Eats home meals. Once a month for eating out. Wants to cut back on carbs.   Exercise: Walks and takes stairs for work. Sits at work. Very active at home, riding bikes.   Sleep: Sleep is good. 7-8 hours. No trouble falling asleep or staying asleep.   Stress: Minimal. Would rest and close eyes, would pray.   Social: , 3 kids 1 girl 2 boys. 7, 11, 15. Lives in [place]. Own a home.   Sexually Active: Yes  Prophylaxis: no  Alcohol: no never  Tobacco: no  never.  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   Vaccinations: N/A  Depression: PHQ9 score: 1          Past Medical History:    Lichen planus      History reviewed. No pertinent surgical history.     History/Other:    Chief Complaint Reviewed and Verified  No Further Nursing Notes to   Review  Tobacco Reviewed  Allergies Reviewed  Medications Reviewed    Problem List Reviewed  Medical History Reviewed  Surgical History   Reviewed  Family History Reviewed  Social History Reviewed         Tobacco:  He has never smoked tobacco.    Current Outpatient Medications   Medication Sig Dispense Refill    methylPREDNISolone (MEDROL) 4 MG Oral Tablet Therapy Pack Dosepack: take as directed (Patient not taking: Reported on 12/7/2023) 1 each 0         Review of Systems:  Review of Systems   Constitutional: Negative.  Negative for activity change, chills and fever.   HENT:  Positive for congestion. Negative for ear pain, postnasal drip, sinus pain, sore throat and trouble swallowing.    Respiratory: Negative.  Negative for cough, shortness of breath and wheezing.     Cardiovascular: Negative.  Negative for chest pain and leg swelling.   Gastrointestinal: Negative.  Negative for abdominal pain, blood in stool, constipation and diarrhea.   Endocrine: Negative.    Genitourinary: Negative.  Negative for difficulty urinating, dysuria and flank pain.   Musculoskeletal: Negative.  Negative for arthralgias, back pain and neck stiffness.   Skin:  Positive for rash. Negative for color change.   Neurological: Negative.  Negative for dizziness and headaches.   Hematological:  Negative for adenopathy.         Objective:   /88 (BP Location: Left arm, Patient Position: Sitting, Cuff Size: large)   Pulse 98   Ht 5' 10\" (1.778 m)   Wt 259 lb 3.2 oz (117.6 kg)   BMI 37.19 kg/m²  Estimated body mass index is 37.19 kg/m² as calculated from the following:    Height as of this encounter: 5' 10\" (1.778 m).    Weight as of this encounter: 259 lb 3.2 oz (117.6 kg).  Physical Exam  Vitals and nursing note reviewed.   Constitutional:       Appearance: Normal appearance. He is normal weight.   HENT:      Head: Normocephalic.      Right Ear: Tympanic membrane, ear canal and external ear normal. There is no impacted cerumen.      Left Ear: Tympanic membrane, ear canal and external ear normal. There is no impacted cerumen.      Nose: Nose normal. No congestion or rhinorrhea.      Mouth/Throat:      Mouth: Mucous membranes are moist.      Tonsils: 3+ on the right. 3+ on the left.   Eyes:      Extraocular Movements: Extraocular movements intact.      Pupils: Pupils are equal, round, and reactive to light.   Cardiovascular:      Rate and Rhythm: Normal rate and regular rhythm.      Pulses: Normal pulses.      Heart sounds: Normal heart sounds. No murmur heard.  Pulmonary:      Effort: Pulmonary effort is normal. No respiratory distress.      Breath sounds: Normal breath sounds. No wheezing.   Abdominal:      General: There is no distension.      Palpations: Abdomen is soft.      Tenderness: There is  no abdominal tenderness.   Musculoskeletal:         General: Normal range of motion.      Cervical back: Normal range of motion and neck supple.      Right lower leg: No edema.      Left lower leg: No edema.   Lymphadenopathy:      Cervical: No cervical adenopathy.   Skin:     General: Skin is warm and dry.      Capillary Refill: Capillary refill takes less than 2 seconds.      Findings: Lesion present. No rash.      Comments: Right index, left base of thumb, right plantar x 3 plantar warts    Left axilla healing boil with small amount of serosanguinous drainage    Posterior neck with folliculitis barbae   Neurological:      General: No focal deficit present.      Mental Status: He is alert and oriented to person, place, and time.   Psychiatric:         Mood and Affect: Mood normal.         Behavior: Behavior normal.           Assessment & Plan:   1. Encounter for wellness examination in adult (Primary)  Assessment & Plan:  Wellness labs ordered.  Encouraged increasing physical activity which includes raising heart rate 3 to 5 days/week for at least 30 minutes at a time, while also performing mild strength exercises.  Patient counseled on importance of dietary modifications, which may include limiting fats, red meat, and carbohydrates, while increasing fruit and vegetable intake, and trying to adhere to a low sodium/Mediterranean diet.  Encouraged to manage stress.  Encouraged good sleep habits.  Encouraged good sexual health.    Patient aware of plan of care. All questions answered to satisfaction of the patient. Patient instructed to call office or reach out via [employer] if any issues arise. For urgent issues and/or reviewed red flags please proceed to the urgent care or ER.  Also, inform the nurse practitioner with any new symptoms or medication side effects.    Orders:  -     Hemoglobin A1C; Future; Expected date: 05/23/2024  -     CBC With Differential With Platelet; Future; Expected date: 05/23/2024  -     Comp  Metabolic Panel (14); Future; Expected date: 05/23/2024  -     Lipid Panel; Future; Expected date: 05/23/2024  -     TSH W Reflex To Free T4; Future; Expected date: 05/23/2024  2. Class 2 obesity due to excess calories without serious comorbidity with body mass index (BMI) of 37.0 to 37.9 in adult  Assessment & Plan:  Encouraged diet and exercise.  Eat well-balanced diet, cut back on carbohydrates, practice portion control.  Exercise regimen should be 3-5 times per week for at least 30 minutes.  Keep walking!  Assess TSH and A1c family history of diabetes  3. Plantar wart  Assessment & Plan:  Plantar wart to right index left thenar and right ball of foot x 3.  Patient not too enthusiastic about cryotherapy and or excision  Educated and provided with Mediplast  Keep in place daily  If irritation recurs removed x 2 to 3 days and restart again  Dermatology referral if needed  4. Enlarged tonsils and adenoids  Assessment & Plan:  Swollen tonsils and adenoids.   Baseline for patient would like to be evaluated by ENT.  Reports snoring with apnea.  Sleep referral ordered with MD Beasley  Orders:  -     ENT Referral - In Network  5. Sleep apnea, obstructive  Assessment & Plan:  Wife reports loud sonorous snoring with periods of apnea  Referral for sleep study to pulm  Orders:  -     Pulmonary Referral - In Network  6. Deviated septum  Assessment & Plan:  Issues with sinus headaches and poor air movement based on what direction his head is facing.  Requesting referral to see ENT  Referral placed to see Amy Nesbitt  Orders:  -     ENT Referral - In Network  7. Abscess of axilla, left  Assessment & Plan:  Completed course of antibiotics cefadroxil and incision and drainage in urgent care end of May.  Encouraged warm compresses twice daily To facilitate drainage  Keep the area dry and clean.      8. Folliculitis barbae  Assessment & Plan:  Folliculitis barbae to posterior neck  Trial hydrocortisone 1%  over-the-counter  Follow-up as needed        Return if symptoms worsen or fail to improve.    Min Trinidad, ROSHAN, 5/23/2024, 10:03 AM

## 2024-05-23 NOTE — ASSESSMENT & PLAN NOTE
Plantar wart to right index left thenar and right ball of foot x 3.  Patient not too enthusiastic about cryotherapy and or excision  Educated and provided with Mediplast  Keep in place daily  If irritation recurs removed x 2 to 3 days and restart again  Dermatology referral if needed

## 2024-06-15 ENCOUNTER — HOSPITAL ENCOUNTER (OUTPATIENT)
Age: 39
Discharge: HOME | End: 2024-06-15
Payer: MEDICAID

## 2024-06-15 VITALS
SYSTOLIC BLOOD PRESSURE: 134 MMHG | OXYGEN SATURATION: 98 % | DIASTOLIC BLOOD PRESSURE: 85 MMHG | HEART RATE: 97 BPM | RESPIRATION RATE: 18 BRPM | TEMPERATURE: 99 F

## 2024-06-15 DIAGNOSIS — U07.1 COVID-19: Primary | ICD-10-CM

## 2024-06-15 DIAGNOSIS — R52 BODY ACHES: ICD-10-CM

## 2024-06-15 LAB
POCT INFLUENZA A: NEGATIVE
POCT INFLUENZA B: NEGATIVE
SARS-COV-2 RNA RESP QL NAA+PROBE: DETECTED

## 2024-06-15 RX ORDER — BENZONATATE 200 MG/1
200 CAPSULE ORAL 3 TIMES DAILY PRN
Qty: 30 CAPSULE | Refills: 0 | Status: SHIPPED | OUTPATIENT
Start: 2024-06-15

## 2024-06-15 NOTE — ED PROVIDER NOTES
Chief Complaint   Patient presents with    Body ache and/or chills       HPI:     Raza Saab is a 39 year old male who presents for evaluation and management of a chief complaint of bodyaches, subjective fevers, cough ongoing for 3 days.  No chest pain or shortness of breath.  No nausea, vomiting, diarrhea, or abdominal pain.    PFSH  PFSH asessment screens reviewed and agree.  Nursing note reviewed and I agree with documentation.    Family History   Problem Relation Age of Onset    Hypertension Father     Diabetes Father     Other (Other) Father         PSORIASIS    Stroke Mother     Diabetes Mother     Hypertension Mother     Diabetes Sister     Diabetes Brother     Hypertension Other         Paternal Family h/o Hypertension     Family history reviewed with patient/caregiver and is not pertinent to presenting problem.  Social History     Socioeconomic History    Marital status:      Spouse name: Not on file    Number of children: Not on file    Years of education: Not on file    Highest education level: Not on file   Occupational History    Not on file   Tobacco Use    Smoking status: Never     Passive exposure: Never    Smokeless tobacco: Never   Vaping Use    Vaping status: Never Used   Substance and Sexual Activity    Alcohol use: No    Drug use: No    Sexual activity: Not on file   Other Topics Concern     Service Not Asked    Blood Transfusions Not Asked    Caffeine Concern Yes     Comment: Soda, rarely    Occupational Exposure Not Asked    Hobby Hazards Not Asked    Sleep Concern Not Asked    Stress Concern Not Asked    Weight Concern Not Asked    Special Diet Not Asked    Back Care Not Asked    Exercise Not Asked    Bike Helmet Not Asked    Seat Belt Not Asked    Self-Exams Not Asked    Left Handed Not Asked    Right Handed Yes    Currently spends a great deal of time in the sun Not Asked    Past Sunlamp Treatments for Acne Not Asked    History of tanning Not Asked    Hx of Spending  Great Deal of Time in Sun Not Asked    Bad sunburns in the past Not Asked    Tanning Salons in the Past Not Asked    Hx of Radiation Treatments Not Asked    Regular use of sun block Not Asked   Social History Narrative    Not on file     Social Determinants of Health     Financial Resource Strain: Not on file   Food Insecurity: Not on file   Transportation Needs: Not on file   Physical Activity: Not on file   Stress: Not on file   Social Connections: Not on file   Housing Stability: Not on file        Findings:    /85   Pulse 97   Temp 98.5 °F (36.9 °C) (Temporal)   Resp 18   SpO2 98%   GENERAL: well developed, well nourished, well hydrated, no distress  HEAD: normocephalic  NECK: supple, no adenopathy  EYES: sclera non icteric bilateral, conjunctiva clear  EARS: TM  bilateral: normal  NOSE: nasal turbinates: swollen, red, and clear drainage  THROAT: clear, without exudates  CARDIO: RRR without murmur  LUNGS: clear to auscultation bilaterally; no rales, rhonchi, or wheezes  SKIN: good skin turgor, no obvious rashes    MDM/Assessment/Plan:   Orders for this encounter:  Orders Placed This Encounter    POCT Flu Test     Order Specific Question:   Release to patient     Answer:   Immediate    Rapid SARS-CoV-2 by PCR     Order Specific Question:   Release to patient     Answer:   Immediate    benzonatate 200 MG Oral Cap     Sig: Take 1 capsule (200 mg total) by mouth 3 (three) times daily as needed.     Dispense:  30 capsule     Refill:  0       Labs performed this visit:  Recent Results (from the past 10 hour(s))   POCT Flu Test    Collection Time: 06/15/24 12:44 PM    Specimen: Nares; Other   Result Value Ref Range    POCT INFLUENZA A Negative Negative    POCT INFLUENZA B Negative Negative   Rapid SARS-CoV-2 by PCR    Collection Time: 06/15/24 12:44 PM    Specimen: Nares; Other   Result Value Ref Range    Rapid SARS-CoV-2 by PCR Detected (A) Not Detected       MDM:   Medical Decision Making  Differentials  considered: Flu versus COVID versus other viral URI versus pneumonia    HPI exam consistent with COVID.  Flu test is negative.  Vitals normal, lungs are clear, low suspicion for pneumonia.  Patient declined Paxlovid.  Discussed supportive care.  Tessalon sent to the pharmacy.  ER precautions were discussed.  Quarantine precautions discussed.  Close follow with primary care provider.  Patient verbalized understanding and agreeable to plan of care.    Amount and/or Complexity of Data Reviewed  Labs: ordered. Decision-making details documented in ED Course.     Details: Covid, flu    Risk  OTC drugs.  Prescription drug management.        Diagnosis:    ICD-10-CM    1. COVID-19  U07.1       2. Body aches  R52 POCT Flu Test     Rapid SARS-CoV-2 by PCR     POCT Flu Test     Rapid SARS-CoV-2 by PCR          All results reviewed and discussed with patient.  See AVS for detailed discharge instructions for your condition today.    Follow Up with:  No follow-up provider specified.

## 2024-06-15 NOTE — DISCHARGE INSTRUCTIONS
Tessalon Perles 1 tablet every 8 hours as needed for cough  Please drink plenty of fluids  Steam showers  Take ibuprofen (Motrin, Advil) 600 mg every 6 hours for fever/aches, take with food  OR  Acetaminophen (Tylenol) 650-1000 mg every 6 hours for fever/aches  Rest!  Mucinex DM twice per day for 7 days, with plenty of fluids  For chest pain, shortness of breath or worsening symptoms, please go to ER  Please see your primary care provider if no improvement in 5-7 days  Quarantine until symptoms improved for at least 24 hours

## 2024-06-24 ENCOUNTER — LAB ENCOUNTER (OUTPATIENT)
Dept: LAB | Age: 39
End: 2024-06-24

## 2024-06-24 DIAGNOSIS — Z00.00 ENCOUNTER FOR WELLNESS EXAMINATION IN ADULT: ICD-10-CM

## 2024-06-24 LAB
ALBUMIN SERPL-MCNC: 4.3 G/DL (ref 3.2–4.8)
ALBUMIN/GLOB SERPL: 1.7 {RATIO} (ref 1–2)
ALP LIVER SERPL-CCNC: 61 U/L
ALT SERPL-CCNC: 26 U/L
ANION GAP SERPL CALC-SCNC: 4 MMOL/L (ref 0–18)
AST SERPL-CCNC: 17 U/L (ref ?–34)
BASOPHILS # BLD AUTO: 0.05 X10(3) UL (ref 0–0.2)
BASOPHILS NFR BLD AUTO: 0.6 %
BILIRUB SERPL-MCNC: 0.3 MG/DL (ref 0.3–1.2)
BUN BLD-MCNC: 20 MG/DL (ref 9–23)
BUN/CREAT SERPL: 19.6 (ref 10–20)
CALCIUM BLD-MCNC: 9.3 MG/DL (ref 8.7–10.4)
CHLORIDE SERPL-SCNC: 107 MMOL/L (ref 98–112)
CHOLEST SERPL-MCNC: 166 MG/DL (ref ?–200)
CO2 SERPL-SCNC: 28 MMOL/L (ref 21–32)
CREAT BLD-MCNC: 1.02 MG/DL
DEPRECATED RDW RBC AUTO: 39.8 FL (ref 35.1–46.3)
EGFRCR SERPLBLD CKD-EPI 2021: 95 ML/MIN/1.73M2 (ref 60–?)
EOSINOPHIL # BLD AUTO: 0.15 X10(3) UL (ref 0–0.7)
EOSINOPHIL NFR BLD AUTO: 1.8 %
ERYTHROCYTE [DISTWIDTH] IN BLOOD BY AUTOMATED COUNT: 13 % (ref 11–15)
EST. AVERAGE GLUCOSE BLD GHB EST-MCNC: 148 MG/DL (ref 68–126)
FASTING PATIENT LIPID ANSWER: YES
FASTING STATUS PATIENT QL REPORTED: YES
GLOBULIN PLAS-MCNC: 2.6 G/DL (ref 2–3.5)
GLUCOSE BLD-MCNC: 133 MG/DL (ref 70–99)
HBA1C MFR BLD: 6.8 % (ref ?–5.7)
HCT VFR BLD AUTO: 44.5 %
HDLC SERPL-MCNC: 46 MG/DL (ref 40–59)
HGB BLD-MCNC: 14.9 G/DL
IMM GRANULOCYTES # BLD AUTO: 0.04 X10(3) UL (ref 0–1)
IMM GRANULOCYTES NFR BLD: 0.5 %
LDLC SERPL CALC-MCNC: 94 MG/DL (ref ?–100)
LYMPHOCYTES # BLD AUTO: 3.56 X10(3) UL (ref 1–4)
LYMPHOCYTES NFR BLD AUTO: 42.9 %
MCH RBC QN AUTO: 28.2 PG (ref 26–34)
MCHC RBC AUTO-ENTMCNC: 33.5 G/DL (ref 31–37)
MCV RBC AUTO: 84.1 FL
MONOCYTES # BLD AUTO: 0.56 X10(3) UL (ref 0.1–1)
MONOCYTES NFR BLD AUTO: 6.8 %
NEUTROPHILS # BLD AUTO: 3.93 X10 (3) UL (ref 1.5–7.7)
NEUTROPHILS # BLD AUTO: 3.93 X10(3) UL (ref 1.5–7.7)
NEUTROPHILS NFR BLD AUTO: 47.4 %
NONHDLC SERPL-MCNC: 120 MG/DL (ref ?–130)
OSMOLALITY SERPL CALC.SUM OF ELEC: 293 MOSM/KG (ref 275–295)
PLATELET # BLD AUTO: 138 10(3)UL (ref 150–450)
POTASSIUM SERPL-SCNC: 4.2 MMOL/L (ref 3.5–5.1)
PROT SERPL-MCNC: 6.9 G/DL (ref 5.7–8.2)
RBC # BLD AUTO: 5.29 X10(6)UL
SODIUM SERPL-SCNC: 139 MMOL/L (ref 136–145)
TRIGL SERPL-MCNC: 149 MG/DL (ref 30–149)
TSI SER-ACNC: 3.04 MIU/ML (ref 0.55–4.78)
VLDLC SERPL CALC-MCNC: 24 MG/DL (ref 0–30)
WBC # BLD AUTO: 8.3 X10(3) UL (ref 4–11)

## 2024-06-24 PROCEDURE — 36415 COLL VENOUS BLD VENIPUNCTURE: CPT

## 2024-06-24 PROCEDURE — 80061 LIPID PANEL: CPT

## 2024-06-24 PROCEDURE — 80053 COMPREHEN METABOLIC PANEL: CPT

## 2024-06-24 PROCEDURE — 85025 COMPLETE CBC W/AUTO DIFF WBC: CPT

## 2024-06-24 PROCEDURE — 83036 HEMOGLOBIN GLYCOSYLATED A1C: CPT

## 2024-06-24 PROCEDURE — 84443 ASSAY THYROID STIM HORMONE: CPT

## 2024-06-24 NOTE — PROGRESS NOTES
Full Name & , verify w/ pt  Informed of below msg, pt understood and had no other questions  He will call to schedule an appt that revolves around his work schedule

## 2024-06-25 ENCOUNTER — OFFICE VISIT (OUTPATIENT)
Dept: FAMILY MEDICINE CLINIC | Facility: CLINIC | Age: 39
End: 2024-06-25

## 2024-06-25 VITALS
WEIGHT: 262 LBS | DIASTOLIC BLOOD PRESSURE: 86 MMHG | HEIGHT: 70 IN | HEART RATE: 76 BPM | SYSTOLIC BLOOD PRESSURE: 114 MMHG | BODY MASS INDEX: 37.51 KG/M2

## 2024-06-25 DIAGNOSIS — E11.9 TYPE 2 DIABETES MELLITUS WITHOUT COMPLICATION, WITHOUT LONG-TERM CURRENT USE OF INSULIN (HCC): Primary | ICD-10-CM

## 2024-06-25 PROBLEM — L02.412 ABSCESS OF AXILLA, LEFT: Status: RESOLVED | Noted: 2024-05-23 | Resolved: 2024-06-25

## 2024-06-25 PROBLEM — Z00.00 ENCOUNTER FOR WELLNESS EXAMINATION IN ADULT: Status: RESOLVED | Noted: 2024-05-23 | Resolved: 2024-06-25

## 2024-06-25 LAB
CREAT UR-SCNC: 85.7 MG/DL
GLUCOSE BLOOD: 105
MICROALBUMIN UR-MCNC: 0.5 MG/DL
MICROALBUMIN/CREAT 24H UR-RTO: 5.8 UG/MG (ref ?–30)
TEST STRIP LOT #: NORMAL NUMERIC

## 2024-06-25 PROCEDURE — 82962 GLUCOSE BLOOD TEST: CPT | Performed by: PHYSICIAN ASSISTANT

## 2024-06-25 PROCEDURE — 99214 OFFICE O/P EST MOD 30 MIN: CPT | Performed by: PHYSICIAN ASSISTANT

## 2024-06-25 RX ORDER — METFORMIN HYDROCHLORIDE 500 MG/1
500 TABLET, EXTENDED RELEASE ORAL
Qty: 90 TABLET | Refills: 3 | Status: SHIPPED | OUTPATIENT
Start: 2024-06-25 | End: 2024-09-23

## 2024-06-25 NOTE — PROGRESS NOTES
HPI:     HPI  A 39-year-old male is in the office for a follow-up and test results. The patient is doing fine at this time.  The patient denies chest pain, SOB, N/V/C/D, fever, dizziness, syncope, and abdominal pain. There are no other concerns today.    Medications:     Current Outpatient Medications   Medication Sig Dispense Refill    metFORMIN  MG Oral Tablet 24 Hr Take 1 tablet (500 mg total) by mouth daily with breakfast. 90 tablet 3       Allergies:   No Known Allergies    History:     Health Maintenance   Topic Date Due    COVID-19 Vaccine (2 - 2023-24 season) 09/01/2023    Influenza Vaccine (Season Ended) 10/01/2024    DTaP,Tdap,and Td Vaccines (2 - Td or Tdap) 03/20/2025    Annual Physical  05/23/2025    Annual Depression Screening  Completed    Pneumococcal Vaccine: Birth to 64yrs  Aged Out       No LMP for male patient.   Past Medical History:     Past Medical History:    Lichen planus       Past Surgical History:   History reviewed. No pertinent surgical history.    Family History:     Family History   Problem Relation Age of Onset    Hypertension Father     Diabetes Father     Other (Other) Father         PSORIASIS    Stroke Mother     Diabetes Mother     Hypertension Mother     Diabetes Sister     Diabetes Brother     Hypertension Other         Paternal Family h/o Hypertension       Social History:     Social History     Socioeconomic History    Marital status:      Spouse name: Not on file    Number of children: Not on file    Years of education: Not on file    Highest education level: Not on file   Occupational History    Not on file   Tobacco Use    Smoking status: Never     Passive exposure: Never    Smokeless tobacco: Never   Vaping Use    Vaping status: Never Used   Substance and Sexual Activity    Alcohol use: No    Drug use: No    Sexual activity: Not on file   Other Topics Concern     Service Not Asked    Blood Transfusions Not Asked    Caffeine Concern Yes     Comment:  Soda, rarely    Occupational Exposure Not Asked    Hobby Hazards Not Asked    Sleep Concern Not Asked    Stress Concern Not Asked    Weight Concern Not Asked    Special Diet Not Asked    Back Care Not Asked    Exercise Not Asked    Bike Helmet Not Asked    Seat Belt Not Asked    Self-Exams Not Asked    Left Handed Not Asked    Right Handed Yes    Currently spends a great deal of time in the sun Not Asked    Past Sunlamp Treatments for Acne Not Asked    History of tanning Not Asked    Hx of Spending Great Deal of Time in Sun Not Asked    Bad sunburns in the past Not Asked    Tanning Salons in the Past Not Asked    Hx of Radiation Treatments Not Asked    Regular use of sun block Not Asked   Social History Narrative    Not on file     Social Determinants of Health     Financial Resource Strain: Not on file   Food Insecurity: Not on file   Transportation Needs: Not on file   Physical Activity: Not on file   Stress: Not on file   Social Connections: Not on file   Housing Stability: Not on file       Review of Systems:   Review of Systems   Constitutional:  Negative for activity change, chills, fatigue and fever.   HENT:  Negative for congestion, ear discharge, ear pain, postnasal drip, rhinorrhea, sinus pressure, sinus pain and sore throat.    Respiratory:  Negative for cough, chest tightness, shortness of breath and wheezing.    Cardiovascular:  Negative for chest pain and palpitations.   Gastrointestinal:  Negative for abdominal distention, abdominal pain, blood in stool, constipation, diarrhea, nausea and vomiting.   Skin:  Negative for rash.        Vitals:    06/25/24 1623 06/25/24 1650   BP: 129/90 114/86   Pulse: 76    Weight: 262 lb (118.8 kg)    Height: 5' 10\" (1.778 m)      Body mass index is 37.59 kg/m².    Physical Exam:   Physical Exam  Vitals reviewed.   Constitutional:       General: He is not in acute distress.     Appearance: He is well-developed.   HENT:      Head: Normocephalic and atraumatic.       Right Ear: External ear normal.      Left Ear: External ear normal.      Nose: Nose normal.   Eyes:      General:         Right eye: No discharge.         Left eye: No discharge.      Conjunctiva/sclera: Conjunctivae normal.   Cardiovascular:      Rate and Rhythm: Normal rate and regular rhythm.      Heart sounds: Normal heart sounds, S1 normal and S2 normal. No murmur heard.  Pulmonary:      Effort: Pulmonary effort is normal.      Breath sounds: Normal breath sounds. No wheezing or rales.   Chest:      Chest wall: No tenderness.   Lymphadenopathy:      Cervical: No cervical adenopathy.   Skin:     Findings: No rash.   Neurological:      Mental Status: He is alert and oriented to person, place, and time.   Psychiatric:         Behavior: Behavior is cooperative.          Assessment and Plan::     Problem List Items Addressed This Visit          HCC Problems    Type 2 diabetes mellitus without complication, without long-term current use of insulin (HCC) - Primary    Relevant Medications    metFORMIN  MG Oral Tablet 24 Hr    Other Relevant Orders    Diabetic Retinopathy Exam  OU - Both Eyes    Diabetic Test Strips and Supplies    Microalb/Creat Ratio, Random Urine    Glucose Blood Test (Completed)   Reviewed and discussed lab results with patient. Start Metformin  mg every day. Instruct the patient to monitor blood sugar pre and post meals.    Goals:  1. Preprandial glucose targets  mg/dL.  2. Systolic blood pressure < 130 and diastolic blood pressure < 80.  3. Hemoglobin A1C < 7%.  4. LDL Cholesterol: LDL Goal <100 with diabetes and no CVD / LDL Goal <70 with DM and existing CVD.    Plan:  1. Advise patient to take medication as directed.  2. Glucose monitoring should be performed: 1 time per day.  3. Discussed the importance of glycemic control to prevent complications of diabetes.  4. Discussed complications of diabetes include retinopathy, neuropathy, nephropathy and cardiovascular  disease.    Lifestyle modifications discussed with patient:   1. Exercise at least 3 days/week   2. Weight loss goal at least >5% of body weight  3. Adhere to diabetic diet with limited intake of sugary drinks. Try to drink water only.  4. Diabetic nutrition counseling and increase consumption of high fiber foods which  are lower in glycemic load such as vegetables, fruits, whole grains, low fat dairy  products, fish, chicken.    Bilateral barefoot skin diabetic exam is normal, visualized feet and the appearance is normal.  Bilateral monofilament/sensation of both feet is normal.  Pulsation pedal pulse exam of both lower legs/feet is normal as well.      Discussed plan of care with pt and pt is in agreement.All questions answered. Pt to call with questions or concerns.

## 2025-02-04 ENCOUNTER — HOSPITAL ENCOUNTER (OUTPATIENT)
Age: 40
Discharge: HOME | End: 2025-02-04
Payer: COMMERCIAL

## 2025-02-04 VITALS
HEART RATE: 99 BPM | DIASTOLIC BLOOD PRESSURE: 86 MMHG | TEMPERATURE: 101 F | OXYGEN SATURATION: 100 % | SYSTOLIC BLOOD PRESSURE: 153 MMHG | RESPIRATION RATE: 20 BRPM

## 2025-02-04 DIAGNOSIS — B34.9 VIRAL ILLNESS: Primary | ICD-10-CM

## 2025-02-04 DIAGNOSIS — R50.9 FEVER IN ADULT: ICD-10-CM

## 2025-02-04 LAB
POCT INFLUENZA A: NEGATIVE
POCT INFLUENZA B: NEGATIVE
S PYO AG THROAT QL: NEGATIVE

## 2025-02-04 PROCEDURE — A9150 MISC/EXPER NON-PRESCRIPT DRU: HCPCS | Performed by: PHYSICIAN ASSISTANT

## 2025-02-04 PROCEDURE — 99213 OFFICE O/P EST LOW 20 MIN: CPT | Performed by: NURSE PRACTITIONER

## 2025-02-04 PROCEDURE — 87880 STREP A ASSAY W/OPTIC: CPT | Performed by: NURSE PRACTITIONER

## 2025-02-04 PROCEDURE — 87502 INFLUENZA DNA AMP PROBE: CPT | Performed by: PHYSICIAN ASSISTANT

## 2025-02-04 RX ORDER — ACETAMINOPHEN 500 MG
1000 TABLET ORAL ONCE
Status: COMPLETED | OUTPATIENT
Start: 2025-02-04 | End: 2025-02-04

## 2025-02-04 RX ORDER — BENZONATATE 200 MG/1
200 CAPSULE ORAL 3 TIMES DAILY PRN
Qty: 21 CAPSULE | Refills: 0 | Status: SHIPPED | OUTPATIENT
Start: 2025-02-04

## 2025-02-04 NOTE — DISCHARGE INSTRUCTIONS
Continue over-the-counter Tylenol as needed for fever comfort or pain take the benzonatate as needed for the cough drink plenty of fluids warm tea with honey you may try over-the-counter Flonase nasal spray and Zyrtec to help with runny nose congestion and postnasal drip follow-up with your primary care provider within a week if you develop any new or worsening symptoms chest pain shortness of breath vomiting diarrhea cannot keep down oral hydration or any new or worsening symptoms go to the nearest emergency department.

## 2025-02-04 NOTE — ED PROVIDER NOTES
Patient Seen in: [facility]      History     Chief Complaint   Patient presents with    Fever     Stated Complaint: Cough Fever    Subjective:   HPI    This is a 39-year-old male presenting with cough fever and bodyaches.  Patient states he has had his symptoms for a day and a half took Tylenol at 8 AM this morning.  Patient states his daughter was sick a couple of days ago with similar symptoms.  Denies any vomiting diarrhea chest pain or shortness of breath.  Home Covid test negative      Objective:     Past Medical History:    Lichen planus              History reviewed. No pertinent surgical history.             Social History     Socioeconomic History    Marital status:    Tobacco Use    Smoking status: Never     Passive exposure: Never    Smokeless tobacco: Never   Vaping Use    Vaping status: Never Used   Substance and Sexual Activity    Alcohol use: No    Drug use: No   Other Topics Concern    Caffeine Concern Yes     Comment: Soda, rarely    Right Handed Yes              Review of Systems    Positive for stated complaint: Cough Fever  Other systems are as noted in HPI.  Constitutional and vital signs reviewed.      All other systems reviewed and negative except as noted above.    Physical Exam     ED Triage Vitals [02/04/25 1417]   /86   Pulse 99   Resp 20   Temp (!) 101.2 °F (38.4 °C)   Temp src Oral   SpO2 100 %   O2 Device None (Room air)       Current Vitals:   Vital Signs  BP: 153/86  Pulse: 99  Resp: 20  Temp: (!) 101.2 °F (38.4 °C)  Temp src: Oral    Oxygen Therapy  SpO2: 100 %  O2 Device: None (Room air)        Physical Exam  Vitals and nursing note reviewed.   Constitutional:       Appearance: Normal appearance.   HENT:      Right Ear: Tympanic membrane normal.      Left Ear: Tympanic membrane normal.      Nose: Congestion present.      Mouth/Throat:      Mouth: Mucous membranes are moist.      Pharynx: Oropharynx is clear.      Comments: Minimal pharyngeal erythema no  tonsillar swelling or exudates uvula midline no hot potato voice  Eyes:      Conjunctiva/sclera: Conjunctivae normal.   Cardiovascular:      Rate and Rhythm: Normal rate.   Pulmonary:      Effort: Pulmonary effort is normal. No respiratory distress.      Breath sounds: Normal breath sounds. No wheezing.      Comments: + cough  Musculoskeletal:         General: Normal range of motion.      Cervical back: Normal range of motion. No rigidity or tenderness.   Lymphadenopathy:      Cervical: No cervical adenopathy.   Skin:     General: Skin is warm.      Capillary Refill: Capillary refill takes less than 2 seconds.   Neurological:      General: No focal deficit present.      Mental Status: He is alert and oriented to person, place, and time.             ED Course     Labs Reviewed   POCT RAPID STREP - Normal   POCT FLU TEST - Normal    Narrative:     This assay is a rapid molecular in vitro test utilizing nucleic acid amplification of influenza A and B viral RNA.            MDM           Medical Decision Making  39-year-old male nontoxic-appearing presenting with cough fever sore throat body aches and chills sick contacts at home.  DDx URI versus influenza versus viral or bacterial pharyngitis versus another respiratory or viral illness no clinical indication for labs or imaging patient will be swabbed for flu the flu is negative COVID will be collected patient did a home COVID test that was negative so will not order COVID at this time patient will be medicated with Tylenol for the fever.    Influenza negative patient made aware of results strep test will be collected  Rapid strep negative patient made aware of results discussed likely a viral illness or respiratory viral illness discussed treatment with benzonatate that will be sent to the pharmacy discussed over-the-counter medications pushing fluids table food as tolerated monitoring urine output and outpatient follow-up and ER precautions.  Patient acknowledges  understanding discharge instructions.    Problems Addressed:  Fever in adult: acute illness or injury  Viral illness: acute illness or injury    Amount and/or Complexity of Data Reviewed  Labs: ordered. Decision-making details documented in ED Course.    Risk  OTC drugs.  Prescription drug management.        Disposition and Plan     Clinical Impression:  1. Viral illness    2. Fever in adult         Disposition:  Discharge  2/4/2025  2:52 pm    Follow-up:  Freeman Rogers MD  [address] [place] [address]  [phone]    In 1 week            Medications Prescribed:  Current Discharge Medication List        START taking these medications    Details   benzonatate 200 MG Oral Cap Take 1 capsule (200 mg total) by mouth 3 (three) times daily as needed for cough.  Qty: 21 capsule, Refills: 0    Associated Diagnoses: Viral illness; Fever in adult                 Supplementary Documentation:

## 2025-02-05 ENCOUNTER — NURSE TRIAGE (OUTPATIENT)
Dept: FAMILY MEDICINE CLINIC | Facility: CLINIC | Age: 40
End: 2025-02-05

## 2025-02-05 NOTE — TELEPHONE ENCOUNTER
Action Requested: Summary for Provider     []  Critical Lab, Recommendations Needed  [] Need Additional Advice  [x]   FYI    []   Need Orders  [] Need Medications Sent to Pharmacy  []  Other     SUMMARY: Patient and wife called. Patient was seen in the urgent care yesterday sent home rapid flu and strep negative. Patient is still having fevers of 102, chills, body aches, cough and post nasal drip. Patient denies chest pain,shortness of breath or nasal congestion.     Patient and spouse advised home care, hydration, warm tea with honey, cough drops, over the counter medication, and humidifier.    Patient has an appointment tomorrow with Dr. Rogers, patient will call back tomorrow if feeling better to cancel.     Reason for call: Cough and Fever  Onset: 2 days  Reason for Disposition   Patient wants to be seen    Protocols used: Cough-A-OH

## 2025-02-06 ENCOUNTER — OFFICE VISIT (OUTPATIENT)
Dept: FAMILY MEDICINE CLINIC | Facility: CLINIC | Age: 40
End: 2025-02-06
Payer: COMMERCIAL

## 2025-02-06 ENCOUNTER — LAB ENCOUNTER (OUTPATIENT)
Dept: LAB | Age: 40
End: 2025-02-06
Attending: FAMILY MEDICINE
Payer: COMMERCIAL

## 2025-02-06 VITALS
HEIGHT: 70 IN | DIASTOLIC BLOOD PRESSURE: 84 MMHG | BODY MASS INDEX: 37.42 KG/M2 | WEIGHT: 261.38 LBS | TEMPERATURE: 101 F | SYSTOLIC BLOOD PRESSURE: 124 MMHG | HEART RATE: 116 BPM

## 2025-02-06 DIAGNOSIS — B34.9 ACUTE VIRAL SYNDROME: ICD-10-CM

## 2025-02-06 DIAGNOSIS — B34.9 ACUTE VIRAL SYNDROME: Primary | ICD-10-CM

## 2025-02-06 DIAGNOSIS — E11.9 TYPE 2 DIABETES MELLITUS WITHOUT COMPLICATION, WITHOUT LONG-TERM CURRENT USE OF INSULIN (HCC): ICD-10-CM

## 2025-02-06 LAB — HEMOGLOBIN A1C: 6.9 % (ref 4.3–5.6)

## 2025-02-06 PROCEDURE — 83036 HEMOGLOBIN GLYCOSYLATED A1C: CPT | Performed by: FAMILY MEDICINE

## 2025-02-06 PROCEDURE — 3008F BODY MASS INDEX DOCD: CPT | Performed by: FAMILY MEDICINE

## 2025-02-06 PROCEDURE — 87798 DETECT AGENT NOS DNA AMP: CPT

## 2025-02-06 PROCEDURE — 99214 OFFICE O/P EST MOD 30 MIN: CPT | Performed by: FAMILY MEDICINE

## 2025-02-06 PROCEDURE — 3079F DIAST BP 80-89 MM HG: CPT | Performed by: FAMILY MEDICINE

## 2025-02-06 PROCEDURE — 3044F HG A1C LEVEL LT 7.0%: CPT | Performed by: FAMILY MEDICINE

## 2025-02-06 PROCEDURE — 3074F SYST BP LT 130 MM HG: CPT | Performed by: FAMILY MEDICINE

## 2025-02-06 PROCEDURE — 87637 SARSCOV2&INF A&B&RSV AMP PRB: CPT

## 2025-02-06 RX ORDER — ONDANSETRON 4 MG/1
4 TABLET, FILM COATED ORAL EVERY 8 HOURS PRN
Qty: 10 TABLET | Refills: 0 | Status: SHIPPED | OUTPATIENT
Start: 2025-02-06

## 2025-02-06 RX ORDER — CODEINE PHOSPHATE AND GUAIFENESIN 10; 100 MG/5ML; MG/5ML
5 SOLUTION ORAL EVERY 6 HOURS PRN
Qty: 180 ML | Refills: 0 | Status: SHIPPED | OUTPATIENT
Start: 2025-02-06

## 2025-02-06 NOTE — PROGRESS NOTES
2/6/2025  1:14 PM    Raza Saab is a 39 year old male.    Chief complaint(s):   Chief Complaint   Patient presents with    Urgent Care F/u     Fever , Cough     Shortness Of Breath     HPI:     Raza Saab primary complaint is regarding as above.     Patient is a 39-year-old male w/ Hx diabetes who presents complaining of fever, body aches and cough for the past 4 days.  Occasional having some shortness of breath associate with a cough.  Denies any chest pain no pleuritic chest pain.  There has been no sore throat, ear pain.  There has been some nausea but no diarrhea and no abdominal pain.  Positive for body aches and headaches.      Patient Raza Saab is a 39 year old male is here to be evaluated for type 2 diabetes.  Specifically, male has type 2, insulin none requiring diabetes. Compliance with treatment has been good.  Patient's diabetes was first diagnosed June 2024 years ago.  Patient follows a 1800 calorie ADA diet.  Patient report experiencing the following diabetes related symptoms; Negative for polyuria, Negative for polydipsia, Negative for blurred vision.  Depression symptoms include none.  Tobacco screen: none  smoker.  Current meds include :  oral hypoglycemic include: NONE  insulin/injectable : -   GLP-1 agonist : -  Statins medication: -  Hypoglycemia episodes is not applicable. he reports home blood glucose readings have been < 130.  Most recent lab results include glycohemoglobin No components found for: \"BOLGT2U\" %, microalbuminuria have been -. Last GFR was 95.   In regard to preventative care, his last ophthalmology exam was in < 12 months ago.  Opthalmic evaluation have shown no pathology.            HISTORY:  Past Medical History:    Lichen planus      No past surgical history on file.   Family History   Problem Relation Age of Onset    Hypertension Father     Diabetes Father     Other (Other) Father         PSORIASIS    Stroke Mother     Diabetes Mother      Hypertension Mother     Diabetes Sister     Diabetes Brother     Hypertension Other         Paternal Family h/o Hypertension      Social History:   Social History     Socioeconomic History    Marital status:    Tobacco Use    Smoking status: Never     Passive exposure: Never    Smokeless tobacco: Never   Vaping Use    Vaping status: Never Used   Substance and Sexual Activity    Alcohol use: No    Drug use: No   Other Topics Concern    Caffeine Concern Yes     Comment: Soda, rarely    Right Handed Yes        Immunizations:   Immunization History   Administered Date(s) Administered    >=3 YRS FLUZONE OR FLUARIX QUAD PRESERVE FREE SINGLE DOSE (05015) FLU CLINIC 11/02/2016    Covid-19 Vaccine Moderna Bivalent 50mcg/0.5mL 12+ years 09/23/2022    FLU VAC QIV SPLIT 3 YRS AND OLDER (44353) 10/19/2018    FLUZONE 6 months and older PFS 0.5 ml (71688) 10/19/2018, 09/23/2022    Influenza 09/20/2010, 11/11/2013    TDAP 03/20/2015    Tb Intradermal Test 10/03/2014, 03/20/2015       Medications (Active prior to today's visit):  Current Outpatient Medications   Medication Sig Dispense Refill    guaiFENesin-codeine 100-10 MG/5ML Oral Solution Take 5 mL by mouth every 6 (six) hours as needed for cough. 180 mL 0    benzonatate 200 MG Oral Cap Take 1 capsule (200 mg total) by mouth 3 (three) times daily as needed for cough. (Patient not taking: Reported on 2/6/2025) 21 capsule 0       Allergies:  Allergies[1]      ROS:   Review of Systems   Constitutional:  Positive for chills and fever. Negative for appetite change and fatigue.   HENT:  Positive for sore throat. Negative for congestion and ear pain.    Respiratory:  Positive for cough. Negative for shortness of breath.    Cardiovascular:  Negative for chest pain.   Gastrointestinal:  Positive for nausea. Negative for abdominal pain, diarrhea and vomiting.   Musculoskeletal:  Positive for myalgias.   Skin:  Negative for rash.   Neurological:  Negative for dizziness, weakness and  headaches.       PHYSICAL EXAM:   VS: /84   Pulse 116   Temp (!) 101.4 °F (38.6 °C)   Ht 5' 10\" (1.778 m)   Wt 261 lb 6.4 oz (118.6 kg)   BMI 37.51 kg/m²     Physical Exam  Vitals reviewed.   Constitutional:       Appearance: Normal appearance. He is well-developed.   HENT:      Head: Normocephalic.      Right Ear: Tympanic membrane normal.      Left Ear: Tympanic membrane normal.      Nose: Congestion present.      Mouth/Throat:      Pharynx: Oropharynx is clear.   Eyes:      General: No scleral icterus.     Conjunctiva/sclera: Conjunctivae normal.   Cardiovascular:      Rate and Rhythm: Normal rate and regular rhythm.   Pulmonary:      Effort: Pulmonary effort is normal.      Breath sounds: Normal breath sounds.   Musculoskeletal:      Cervical back: Neck supple.   Skin:     Findings: No rash.   Psychiatric:         Mood and Affect: Mood normal.         LABORATORY RESULTS:   No results found for: \"URCOLOR\", \"URCLA\", \"URINELEUK\", \"URINENITRITE\", \"URINEBLOOD\"   Results for orders placed or performed in visit on 02/06/25   POC Glycohemoglobin [32209]    Collection Time: 02/06/25  1:27 PM   Result Value Ref Range    HEMOGLOBIN A1C 6.9 (A) 4.3 - 5.6 %    Cartridge Lot# [id number] Numeric    Cartridge Expiration Date 10/11/26 Date       EKG / Spirometry : -     Radiology: No results found.     ASSESSMENT/PLAN:   Assessment   Encounter Diagnoses   Name Primary?    Acute viral syndrome Yes    Type 2 diabetes mellitus without complication, without long-term current use of insulin ([facility])      1. Acute viral syndrome    MEDICATIONS:     Requested Prescriptions     Signed Prescriptions Disp Refills    guaiFENesin-codeine 100-10 MG/5ML Oral Solution 180 mL 0     Sig: Take 5 mL by mouth every 6 (six) hours as needed for cough.    ondansetron (ZOFRAN) 4 mg tablet 10 tablet 0     Sig: Take 1 tablet (4 mg total) by mouth every 8 (eight) hours as needed for Nausea.           LABORATORY & ORDERS:   Orders Placed This  Encounter   Procedures        B.Pertussis B.Parapertussis PCR    SARS-CoV-2/Flu A and B/RSV by PCR ([facility])   RECOMMENDATIONS given include: Patient was reassured of  his medical condition and all questions and concerns were answered. Patient was informed to please, call our office with any new or further questions or concerns that may come up in the near future. Notify Dr Rogers or the [facility] if there is a deterioration or worsening of the medical condition. Also, inform the doctor with any new symptoms or medications' side effects.      FOLLOW-UP: Schedule a follow-up visit in  prn.         2. Type 2 diabetes mellitus without complication, without long-term current use of insulin ([facility])    DIABETES A&P    LABORATORY & ORDERS: Blood test(s) ordered today ;   Orders Placed This Encounter   Procedures    POC Glycohemoglobin [25659]    B.Pertussis B.Parapertussis PCR    SARS-CoV-2/Flu A and B/RSV by PCR ([facility])     Additional orders include: diet control  MEDICATIONS:    guaiFENesin-codeine 100-10 MG/5ML Oral Solution, Take 5 mL by mouth every 6 (six) hours as needed for cough., Disp: 180 mL, Rfl: 0    ondansetron (ZOFRAN) 4 mg tablet, Take 1 tablet (4 mg total) by mouth every 8 (eight) hours as needed for Nausea., Disp: 10 tablet, Rfl: 0    benzonatate 200 MG Oral Cap, Take 1 capsule (200 mg total) by mouth 3 (three) times daily as needed for cough. (Patient not taking: Reported on 2/6/2025), Disp: 21 capsule, Rfl: 0.  Requested Prescriptions     Signed Prescriptions Disp Refills    guaiFENesin-codeine 100-10 MG/5ML Oral Solution 180 mL 0     Sig: Take 5 mL by mouth every 6 (six) hours as needed for cough.    ondansetron (ZOFRAN) 4 mg tablet 10 tablet 0     Sig: Take 1 tablet (4 mg total) by mouth every 8 (eight) hours as needed for Nausea.      REFERRALS:       Procedures    POC Glycohemoglobin [67974]    B.Pertussis B.Parapertussis PCR     RECOMMENDATIONS: instructed in use of glucometer ( check  fasting glucose rarely), return for training in administering insulin injections, adherence to an 1800 calorie ADA diet,  5 pound weight loss, a graduated exercise program, HgbA1C level checked quarterly, daily foot self-inspection, need for yearly flu shots, and avoid all sodas, juices, candy, chocolates, cakes, ice cream, etc.      FOLLOW-UP: Schedule a follow-up visit in 4 months.          Orders This Visit:  Orders Placed This Encounter   Procedures    POC Glycohemoglobin [84935]    B.Pertussis B.Parapertussis PCR    SARS-CoV-2/Flu A and B/RSV by PCR ([facility])       Meds This Visit:  Requested Prescriptions     Signed Prescriptions Disp Refills    guaiFENesin-codeine 100-10 MG/5ML Oral Solution 180 mL 0     Sig: Take 5 mL by mouth every 6 (six) hours as needed for cough.       Imaging & Referrals:  None         LALITHA ANDERSON MD       [1] No Known Allergies

## 2025-02-07 LAB
FLUAV + FLUBV RNA SPEC NAA+PROBE: NOT DETECTED
FLUAV + FLUBV RNA SPEC NAA+PROBE: NOT DETECTED
RSV RNA SPEC NAA+PROBE: NOT DETECTED
SARS-COV-2 RNA RESP QL NAA+PROBE: NOT DETECTED

## (undated) NOTE — LETTER
5/18/2023          To Whom It May Concern:    Raza Montiel is currently under my medical care and may not return to work at this time. He may return to work on 5/20/2023. Activity is restricted as follows: none. If you require additional information please contact our office.         Sincerely,    Kalpesh Florentino PA-C          Document generated by:  Kalpesh Florentino PA-C

## (undated) NOTE — LETTER
5/18/2023          To Whom It May Concern:    Mohammed A Benedetta Meckel is currently under my medical care and may not return to work at this time. He may return to work on 5/19/2023. Activity is restricted as follows: none. If you require additional information please contact our office.         Sincerely,    Geneva Castillo PA-C          Document generated by:  Geneva Castillo PA-C

## (undated) NOTE — LETTER
Date & Time: 9/28/2021, 3:58 PM  Patient: Dewayne Garcia  Encounter Provider(s): Theo Finley MD       To Whom It May Concern:    Jhonathan Hanna was seen and treated in our department on 9/28/2021. He can return to work.     If you have any q

## (undated) NOTE — LETTER
22      Patient: Dashawn Francisco  : 2/15/1985 Visit date: 2022    Dear Shawn Pearson,      I examined your patient in consultation today. He has a nondisplaced volar lip fracture of the left middle finger proximal joint.   We

## (undated) NOTE — LETTER
Patient Name: Audra Johnson  : 2/15/1985  MRN: [id number]  Patient Address: [place] [address] Disease 2019 (COVID-19)     [facility] is committed to the safety and well-being of our patients, members, e carefully. If your symptoms get worse, call your healthcare provider immediately. 3. Get rest and stay hydrated.    4. If you have a medical appointment, call the healthcare provider ahead of time and tell them that you have or may have COVID-19.  5. For m of fever-reducing medications; and  · Improvement in respiratory symptoms (e.g., cough, shortness of breath); and  · At least 10 days have passed since symptoms first appeared OR if asymptomatic patient or date of symptom onset is unclear then use 10 days donors must:    · Have had a confirmed diagnosis of COVID-19  · Be symptom-free for at least 14 days*    *Some people will be required to have a repeat COVID-19 test in order to be eligible to donate.  If you’re instructed by [facility] that a repeat test is r random. Researchers are trying to identify similarities between people with a Post-COVID condition to better understand if there are risk factors. How do I prevent a Post-COVID condition?   The best way to prevent the long-term symptoms of COVID-19 is

## (undated) NOTE — ED AVS SNAPSHOT
[facility] AND [facility] in [facility] [address]  [address] [place]    Phone:  [phone]    Fax:  [phone]           Lin Cisneros   MRN: [id number]    Department:  [facility] in [place] [address] benefit level being available to you or other limited reimbursement. The physician may seek payment directly from you for amounts other than your deductible, co-payment, or co-insurance and for other services not covered under your health insurance plan. If you believe that any of the medications or instructions on this list is different from what your Primary Care doctor has instructed you - please continue to take your medications as instructed by your Primary Care doctor until you can check with your do [address] to help you get signed up for insurance coverage. Patient [address] is a Federal Navigator program that can help with your Affordable Care Act coverage, as well as all types of Medicaid plans.   To get signed up and covere

## (undated) NOTE — LETTER
Date & Time: 2/4/2025, 2:54 PM  Patient: Raza Saab  Encounter Provider(s):    Rosalia Bal APRN       To Whom It May Concern:    Raza Saab was seen and treated in our department on 2/4/2025. He should not return to work until fever free for 24 hours without medication .    If you have any questions or concerns, please do not hesitate to call.    KRISTY Valentino    _____________________________  Physician/[facility] Signature